# Patient Record
Sex: FEMALE | Race: WHITE | NOT HISPANIC OR LATINO | URBAN - METROPOLITAN AREA
[De-identification: names, ages, dates, MRNs, and addresses within clinical notes are randomized per-mention and may not be internally consistent; named-entity substitution may affect disease eponyms.]

---

## 2022-01-13 ENCOUNTER — LAB (OUTPATIENT)
Dept: LAB | Facility: HOSPITAL | Age: 47
End: 2022-01-13

## 2022-01-13 ENCOUNTER — OFFICE VISIT (OUTPATIENT)
Dept: NEUROLOGY | Facility: CLINIC | Age: 47
End: 2022-01-13

## 2022-01-13 VITALS
TEMPERATURE: 98.4 F | HEART RATE: 62 BPM | SYSTOLIC BLOOD PRESSURE: 140 MMHG | DIASTOLIC BLOOD PRESSURE: 78 MMHG | OXYGEN SATURATION: 99 %

## 2022-01-13 DIAGNOSIS — M79.10 MYALGIA: ICD-10-CM

## 2022-01-13 DIAGNOSIS — R20.2 PARESTHESIA: ICD-10-CM

## 2022-01-13 DIAGNOSIS — R25.1 TREMOR OF BOTH HANDS: ICD-10-CM

## 2022-01-13 DIAGNOSIS — R29.898 WEAKNESS OF BOTH HANDS: ICD-10-CM

## 2022-01-13 DIAGNOSIS — R20.2 PARESTHESIA: Primary | ICD-10-CM

## 2022-01-13 PROBLEM — N92.0 MENORRHAGIA WITH REGULAR CYCLE: Status: ACTIVE | Noted: 2019-02-04

## 2022-01-13 PROBLEM — I10 HTN (HYPERTENSION): Status: ACTIVE | Noted: 2019-04-11

## 2022-01-13 PROBLEM — E65: Status: ACTIVE | Noted: 2019-04-11

## 2022-01-13 PROCEDURE — 86051 AQUAPORIN-4 ANTB ELISA: CPT

## 2022-01-13 PROCEDURE — 86200 CCP ANTIBODY: CPT

## 2022-01-13 PROCEDURE — 82550 ASSAY OF CK (CPK): CPT

## 2022-01-13 PROCEDURE — 86431 RHEUMATOID FACTOR QUANT: CPT

## 2022-01-13 PROCEDURE — 84155 ASSAY OF PROTEIN SERUM: CPT

## 2022-01-13 PROCEDURE — 86140 C-REACTIVE PROTEIN: CPT

## 2022-01-13 PROCEDURE — 84165 PROTEIN E-PHORESIS SERUM: CPT

## 2022-01-13 PROCEDURE — 86362 MOG-IGG1 ANTB CBA EACH: CPT

## 2022-01-13 PROCEDURE — 86255 FLUORESCENT ANTIBODY SCREEN: CPT

## 2022-01-13 PROCEDURE — 83519 RIA NONANTIBODY: CPT

## 2022-01-13 PROCEDURE — 36415 COLL VENOUS BLD VENIPUNCTURE: CPT

## 2022-01-13 PROCEDURE — 86618 LYME DISEASE ANTIBODY: CPT

## 2022-01-13 PROCEDURE — 82595 ASSAY OF CRYOGLOBULIN: CPT

## 2022-01-13 PROCEDURE — 86334 IMMUNOFIX E-PHORESIS SERUM: CPT

## 2022-01-13 PROCEDURE — 99204 OFFICE O/P NEW MOD 45 MIN: CPT | Performed by: NURSE PRACTITIONER

## 2022-01-13 PROCEDURE — 86038 ANTINUCLEAR ANTIBODIES: CPT

## 2022-01-13 PROCEDURE — 82164 ANGIOTENSIN I ENZYME TEST: CPT

## 2022-01-13 PROCEDURE — 82728 ASSAY OF FERRITIN: CPT

## 2022-01-13 PROCEDURE — 82784 ASSAY IGA/IGD/IGG/IGM EACH: CPT

## 2022-01-13 RX ORDER — PREGABALIN 75 MG/1
75 CAPSULE ORAL 2 TIMES DAILY
Qty: 60 CAPSULE | Refills: 2 | Status: SHIPPED | OUTPATIENT
Start: 2022-01-13 | End: 2022-01-13 | Stop reason: SDUPTHER

## 2022-01-13 RX ORDER — DULOXETIN HYDROCHLORIDE 60 MG/1
CAPSULE, DELAYED RELEASE ORAL
COMMUNITY
Start: 2021-12-14

## 2022-01-13 RX ORDER — PREGABALIN 75 MG/1
75 CAPSULE ORAL 2 TIMES DAILY
Qty: 60 CAPSULE | Refills: 2 | Status: SHIPPED | OUTPATIENT
Start: 2022-01-13 | End: 2022-01-14 | Stop reason: SDUPTHER

## 2022-01-13 RX ORDER — METOPROLOL SUCCINATE 25 MG/1
TABLET, EXTENDED RELEASE ORAL
COMMUNITY
Start: 2021-12-14 | End: 2022-02-24

## 2022-01-13 NOTE — PROGRESS NOTES
Neuro Office Visit      Encounter Date: 2022   Patient Name: Obdulia Ramesh  : 1975   MRN: [id number]   PCP: Dayanna Arango MD  Referring: VINI Boo  Chief Complaint:    Chief Complaint   Patient presents with   • Tremors   • Hand Pain   • Headache       History of Present Illness: Obdulia Ramesh is a 46 y.o. female who is here today in Neurology for tremor, paresthesia, HA    Has had multiple evaluations for symptoms.    Previously evaluated by Dr Webster      Tremor  Onset 3 years ago. Started in hands. Last year developed head tremor. R>L. Worse with activity. Not noticeable at rest. Worse with activity and stress and fatigue. Yes, yes, tremor. Worse with fatigue and stress.    Hand pain  Thenar emminence constant, worse with movement, can keep her awake at night. Sharp, cutting sensation. Tylenol and motrin not effective. No relief. Also had whole hand pain felt like pressure from the inside. Did not look swollen but felt swollen. Hands are weak.  No change in shoulders.   Intermittent neck pain is tension right posterior neck to scapula. Not worse with movement. Decreased sensation in hands. Descreased discrimination of hot/cold    Paresthesia  Bilat UE and bilat LE numbness and tingling/pins and needles x 10 months. Feet feel like a cold chill.   Symptoms are intermittent. Feel jittery.  Feels like she needs to move. No aggravating or alleviating factors. Keeps her awake at night. Heat does not help    EMG upper ext-nml      HA  Has had HA for years. Worse in last 1-2 years  Freq: daily mild HA. 1-2 severe HA/week  Location: top of head  Quality: Pressure from the inside, Center of head to holocranial.  Severity: /10. Has not been to ER  Duration: Daily HA is all day. Severe HA is 1-2 hours  Assoc sx: +nausea, -vomiting, +photophobia, + phonophobia, no vision changes, occasional low pitched ringing, not whooshing sound    Abortives:Excedrin, Tylenol, IBU  Preventatives:Cymbalta,  metoprolol      Meds: Duloxetine  Labs TSH, vit D, vit B12, cmp,  Cbc, I7d-eff  Subjective      Past Medical History: History reviewed. No pertinent past medical history.    Past Surgical History:   Past Surgical History:   Procedure Laterality Date   • BREAST AUGMENTATION     • EXPLORATORY LAPAROTOMY         Family History:   Family History   Problem Relation Age of Onset   • Liver cancer Mother    • Heart disease Father    • Skin cancer Father    • No Known Problems Sister    • No Known Problems Brother    • Breast cancer Maternal Grandmother    • Colon cancer Maternal Grandmother    • Leukemia Maternal Grandmother    • No Known Problems Maternal Grandfather    • No Known Problems Paternal Grandmother    • No Known Problems Paternal Grandfather    • No Known Problems Daughter    • Hashimoto's thyroiditis Daughter        Social History:   Social History     Socioeconomic History   • Marital status:    Tobacco Use   • Smoking status: Never Smoker   • Smokeless tobacco: Never Used   Substance and Sexual Activity   • Alcohol use: Yes     Alcohol/week: 2.0 standard drinks     Types: 2 Glasses of wine per week     Comment: per year   • Drug use: Never   • Sexual activity: Yes       Medications:     Current Outpatient Medications:   •  DULoxetine (CYMBALTA) 60 MG capsule, TAKE 1 CAPSULE BY MOUTH ONCE DAILY DO NOT CRUSH OR CHEW, Disp: , Rfl:   •  metoprolol succinate XL (TOPROL-XL) 25 MG 24 hr tablet, TAKE 1 TABLET BY MOUTH ONCE DAILY. DO NOT CRUSH OR CHEW, Disp: , Rfl:   •  pregabalin (Lyrica) 75 MG capsule, Take 1 capsule by mouth 2 (Two) Times a Day., Disp: 60 capsule, Rfl: 2    Allergies:   Allergies   Allergen Reactions   • Clindamycin Itching, Rash and Unknown (See Comments)   • Latex Itching     sensitivity       PHQ-9 Total Score:     STEADI Fall Risk Assessment has not been completed.    Objective     Physical Exam:   Physical Exam  Eyes:      Pupils: Pupils are equal, round, and reactive to light.    Neurological:      Mental Status: She is oriented to person, place, and time.      Coordination: Finger-Nose-Finger Test, Heel to Shin Test and Romberg Test normal.      Gait: Gait is intact.      Deep Tendon Reflexes:      Reflex Scores:       Tricep reflexes are 2+ on the right side and 2+ on the left side.       Bicep reflexes are 2+ on the right side and 2+ on the left side.       Brachioradialis reflexes are 2+ on the right side and 2+ on the left side.       Patellar reflexes are 2+ on the right side and 2+ on the left side.       Achilles reflexes are 2+ on the right side and 2+ on the left side.  Psychiatric:         Speech: Speech normal.         Neurologic Exam     Mental Status   Oriented to person, place, and time.   Follows 3 step commands.   Attention: normal. Concentration: normal.   Speech: speech is normal   Level of consciousness: alert  Knowledge: consistent with education.   Normal comprehension.     Cranial Nerves     CN III, IV, VI   Pupils are equal, round, and reactive to light.  Right pupil: Accommodation: intact.   Left pupil: Accommodation: intact.   CN III: no CN III palsy  CN VI: no CN VI palsy  Nystagmus: none   Diplopia: none  Upgaze: normal  Downgaze: normal  Conjugate gaze: present    CN VII   Facial expression full, symmetric.     CN VIII   Hearing: intact    CN XII   CN XII normal.     Motor Exam   Muscle bulk: normal  Overall muscle tone: normal    Strength   Right deltoid: 4/5  Left deltoid: 5/5  Right biceps: 4/5  Left biceps: 5/5  Right triceps: 4/5  Left triceps: 5/5  Right wrist flexion: 4/5  Left wrist flexion: 5/5  Right wrist extension: 4/5  Left wrist extension: 5/5  Right interossei: 4/5  Left interossei: 5/5  Right abdominals: 5/5  Left abdominals: 5/5  Right iliopsoas: 5/5  Left iliopsoas: 5/5  Right quadriceps: 5/5  Left quadriceps: 5/5  Right hamstrin/5  Left hamstrin/5  Right glutei: 5/5  Left glutei: 5/5  Right anterior tibial: 5/5  Left anterior tibial:  5/5  Right posterior tibial: 5/5  Left posterior tibial: 5/5  Right peroneal: 5/5  Left peroneal: 5/5  Right gastroc: 5/5  Left gastroc: 5/5    Sensory Exam   Right arm light touch: decreased from elbow  Left arm light touch: normal  Right leg light touch: decreased from knee  Left leg light touch: normal    Gait, Coordination, and Reflexes     Gait  Gait: normal    Coordination   Romberg: negative  Finger to nose coordination: normal  Heel to shin coordination: normal    Tremor   Resting tremor: absent (Head tremor not appreciated)  Intention tremor: present  Action tremor: right arm    Reflexes   Right brachioradialis: 2+  Left brachioradialis: 2+  Right biceps: 2+  Left biceps: 2+  Right triceps: 2+  Left triceps: 2+  Right patellar: 2+  Left patellar: 2+  Right achilles: 2+  Left achilles: 2+  Right : 2+  Left : 2+       Vital Signs:   Vitals:    01/13/22 1508   BP: 140/78   BP Location: Left arm   Pulse: 62   Temp: 98.4 °F (36.9 °C)   SpO2: 99%     There is no height or weight on file to calculate BMI.       Assessment / Plan      Assessment/Plan:   Diagnoses and all orders for this visit:    1. Paresthesia (Primary)  -     Lyme Disease Antibodies, Total and IgM with Reflex to Line Blot; Future  -     CK; Future  -     C-reactive Protein; Future  -     Cryoglobulin; Future  -     Ferritin; Future  -     ERIC + PE; Future  -     Myasthenia Gravis Full Panel w/MuSK Reflex; Future  -     Rheumatoid Arthritis (RA) Profile; Future  -     JAMISON by IFA, Reflex 9-biomarkers profile; Future  -     Angiotensin Converting Enzyme; Future  -     Neuromyelitis Optica (NMO) Auto Antibody, IgG; Future  -     Anti-Myelin Oligodendrocyte Glycoprotein (MOG), Serum; Future  -     MRI Brain With & Without Contrast; Future  -     MRI Cervical Spine With & Without Contrast; Future  -     EMG & Nerve Conduction Test; Future  -     pregabalin (Lyrica) 75 MG capsule; Take 1 capsule by mouth 2 (Two) Times a Day.  Dispense: 60  capsule; Refill: 2    2. Weakness of both hands  -     Lyme Disease Antibodies, Total and IgM with Reflex to Line Blot; Future  -     CK; Future  -     C-reactive Protein; Future  -     Cryoglobulin; Future  -     Ferritin; Future  -     ERIC + PE; Future  -     Myasthenia Gravis Full Panel w/MuSK Reflex; Future  -     Rheumatoid Arthritis (RA) Profile; Future  -     JAMISON by IFA, Reflex 9-biomarkers profile; Future  -     Angiotensin Converting Enzyme; Future  -     Neuromyelitis Optica (NMO) Auto Antibody, IgG; Future  -     Anti-Myelin Oligodendrocyte Glycoprotein (MOG), Serum; Future  -     MRI Brain With & Without Contrast; Future  -     MRI Cervical Spine With & Without Contrast; Future  -     EMG & Nerve Conduction Test; Future  -     pregabalin (Lyrica) 75 MG capsule; Take 1 capsule by mouth 2 (Two) Times a Day.  Dispense: 60 capsule; Refill: 2    3. Myalgia  -     CK; Future  -     pregabalin (Lyrica) 75 MG capsule; Take 1 capsule by mouth 2 (Two) Times a Day.  Dispense: 60 capsule; Refill: 2    4. Tremor of both hands  -     C-reactive Protein; Future  -     ERIC + PE; Future  -     Myasthenia Gravis Full Panel w/MuSK Reflex; Future  -     MRI Brain With & Without Contrast; Future  -     MRI Cervical Spine With & Without Contrast; Future  -     pregabalin (Lyrica) 75 MG capsule; Take 1 capsule by mouth 2 (Two) Times a Day.  Dispense: 60 capsule; Refill: 2      As a part of this patient's therapy a controlled substance was prescribed. Instructed on the safe and proper use of this medication along with potential risks. Controlled substance contract signed and scanned. Bernard will be reviewed and UDS obtained as indicated.      Patient Education:     Reviewed medications, potential side effects and signs and symptoms to report. Discussed risk versus benefits of treatment plan with patient and/or family-including medications, labs and radiology that may be ordered. Addressed questions and concerns during visit.  Patient and/or family verbalized understanding and agree with plan. Instructed to call the office with any questions and report to ER with any life-threatening symptoms.     Follow Up:   Return in about 6 weeks (around 2/24/2022) for Recheck.    During this visit the following were done:  Labs Reviewed [x]    Labs Ordered [x]    Radiology Reports Reviewed []    Radiology Ordered [x]    PCP Records Reviewed [x]    Referring Provider Records Reviewed [x]    ER Records Reviewed []    Hospital Records Reviewed []    History Obtained From Family []    Radiology Images Reviewed []    Other Reviewed [x]    Records Requested []      Titi Méndez, DNP, APRN

## 2022-01-14 ENCOUNTER — TELEPHONE (OUTPATIENT)
Dept: NEUROLOGY | Facility: CLINIC | Age: 47
End: 2022-01-14

## 2022-01-14 DIAGNOSIS — R25.1 TREMOR OF BOTH HANDS: ICD-10-CM

## 2022-01-14 DIAGNOSIS — R20.2 PARESTHESIA: ICD-10-CM

## 2022-01-14 DIAGNOSIS — R74.8 ELEVATED CK: Primary | ICD-10-CM

## 2022-01-14 DIAGNOSIS — M79.10 MYALGIA: ICD-10-CM

## 2022-01-14 DIAGNOSIS — R29.898 WEAKNESS OF BOTH HANDS: ICD-10-CM

## 2022-01-14 LAB
CK SERPL-CCNC: 475 U/L (ref 20–180)
CRP SERPL-MCNC: <0.3 MG/DL (ref 0–0.5)
FERRITIN SERPL-MCNC: 36.3 NG/ML (ref 13–150)

## 2022-01-14 RX ORDER — PREGABALIN 75 MG/1
75 CAPSULE ORAL 2 TIMES DAILY
Qty: 60 CAPSULE | Refills: 2 | Status: SHIPPED | OUTPATIENT
Start: 2022-01-14 | End: 2022-04-15 | Stop reason: SDUPTHER

## 2022-01-14 NOTE — TELEPHONE ENCOUNTER
Called and discussed CK. Denies trauma, exercise, supplements. Will rest and hydrate this weekend. Repeat labs next week.

## 2022-01-14 NOTE — TELEPHONE ENCOUNTER
Caller: Obdulia Ramesh    Relationship: Self    Best call back number: [phone]    Requested Prescriptions:   Requested Prescriptions     Pending Prescriptions Disp Refills   • pregabalin (Lyrica) 75 MG capsule 60 capsule 2     Sig: Take 1 capsule by mouth 2 (Two) Times a Day.        Pharmacy where request should be sent: [id number] - [address] - [phone] [facility] [phone] FX     Additional details provided by patient: PT STATES D/T INSURANCE REASONS SHE IS HAVING TO CHANGE HER PHARMACY, SHE ASK THAT THIS PRESCRIPTION BE SENT TO [facility] I HAVE INDEXED IN CHART. PLEASE REVIEW AND ADVISE. THANK YOU.     Does the patient have less than a 3 day supply:  [] Yes  [] No    Ruby Herrera Rep   01/14/22 08:58 EST

## 2022-01-15 LAB
B BURGDOR IGG+IGM SER-ACNC: <0.91 ISR (ref 0–0.9)
B BURGDOR IGM SER IA-ACNC: <0.8 INDEX (ref 0–0.79)

## 2022-01-16 LAB
ANA TITR SER IF: NEGATIVE {TITER}
LABORATORY COMMENT REPORT: NORMAL

## 2022-01-17 LAB
ACE SERPL-CCNC: 25 U/L (ref 14–82)
ALBUMIN SERPL ELPH-MCNC: 3.9 G/DL (ref 2.9–4.4)
ALBUMIN/GLOB SERPL: 1.2 {RATIO} (ref 0.7–1.7)
ALPHA1 GLOB SERPL ELPH-MCNC: 0.2 G/DL (ref 0–0.4)
ALPHA2 GLOB SERPL ELPH-MCNC: 0.7 G/DL (ref 0.4–1)
B-GLOBULIN SERPL ELPH-MCNC: 1.2 G/DL (ref 0.7–1.3)
GAMMA GLOB SERPL ELPH-MCNC: 1.2 G/DL (ref 0.4–1.8)
GLOBULIN SER-MCNC: 3.3 G/DL (ref 2.2–3.9)
IGA SERPL-MCNC: 367 MG/DL (ref 87–352)
IGG SERPL-MCNC: 1179 MG/DL (ref 586–1602)
IGM SERPL-MCNC: 139 MG/DL (ref 26–217)
INTERPRETATION SERPL IEP-IMP: ABNORMAL
LABORATORY COMMENT REPORT: ABNORMAL
M PROTEIN SERPL ELPH-MCNC: ABNORMAL G/DL
MOG AB SER QL CBA IFA: NEGATIVE
PROT SERPL-MCNC: 7.2 G/DL (ref 6–8.5)

## 2022-01-18 LAB
CCP IGA+IGG SERPL IA-ACNC: 5 UNITS (ref 0–19)
RHEUMATOID FACT SERPL-ACNC: <10 IU/ML

## 2022-01-20 LAB — CRYOGLOB SER QL 1D COLD INC: NORMAL

## 2022-01-21 ENCOUNTER — LAB (OUTPATIENT)
Dept: LAB | Facility: HOSPITAL | Age: 47
End: 2022-01-21

## 2022-01-21 DIAGNOSIS — R74.8 ELEVATED CK: ICD-10-CM

## 2022-01-21 LAB
ANION GAP SERPL CALCULATED.3IONS-SCNC: 9.9 MMOL/L (ref 5–15)
AQP4 H2O CHANNEL IGG SERPL QL: NEGATIVE
BUN SERPL-MCNC: 8 MG/DL (ref 6–20)
BUN/CREAT SERPL: 9.8 (ref 7–25)
CALCIUM SPEC-SCNC: 9.2 MG/DL (ref 8.6–10.5)
CHLORIDE SERPL-SCNC: 104 MMOL/L (ref 98–107)
CK SERPL-CCNC: 77 U/L (ref 20–180)
CO2 SERPL-SCNC: 28.1 MMOL/L (ref 22–29)
CREAT SERPL-MCNC: 0.82 MG/DL (ref 0.57–1)
GFR SERPL CREATININE-BSD FRML MDRD: 75 ML/MIN/1.73
GLUCOSE SERPL-MCNC: 59 MG/DL (ref 65–99)
POTASSIUM SERPL-SCNC: 4 MMOL/L (ref 3.5–5.2)
SODIUM SERPL-SCNC: 142 MMOL/L (ref 136–145)

## 2022-01-21 PROCEDURE — 80048 BASIC METABOLIC PNL TOTAL CA: CPT

## 2022-01-21 PROCEDURE — 36415 COLL VENOUS BLD VENIPUNCTURE: CPT

## 2022-01-21 PROCEDURE — 82550 ASSAY OF CK (CPK): CPT

## 2022-01-24 ENCOUNTER — TELEPHONE (OUTPATIENT)
Dept: NEUROLOGY | Facility: CLINIC | Age: 47
End: 2022-01-24

## 2022-01-24 NOTE — TELEPHONE ENCOUNTER
----- Message from Titi Méndez, MEHRAN, APRN sent at 1/24/2022  8:13 AM EST -----  Pls notify pt her labs are stable. Electrolytes and muscle breakdown are normalo.

## 2022-01-25 LAB
ACHR BIND AB SER-SCNC: 0.05 NMOL/L (ref 0–0.24)
ACHR BLOCK AB SER-ACNC: 18 % (ref 0–25)
ACHR MOD AB/ACHR TOTAL SFR SER: NORMAL %
MUSK AB SER-SCNC: <1 U/ML
REFLEX INFORMATION: NORMAL
STRIA MUS AB TITR SER IF: NORMAL {TITER}

## 2022-02-05 ENCOUNTER — HOSPITAL ENCOUNTER (OUTPATIENT)
Dept: MRI IMAGING | Facility: HOSPITAL | Age: 47
Discharge: HOME | End: 2022-02-05

## 2022-02-05 DIAGNOSIS — R25.1 TREMOR OF BOTH HANDS: ICD-10-CM

## 2022-02-05 DIAGNOSIS — R20.2 PARESTHESIA: ICD-10-CM

## 2022-02-05 DIAGNOSIS — R29.898 WEAKNESS OF BOTH HANDS: ICD-10-CM

## 2022-02-05 PROCEDURE — 0 GADOBENATE DIMEGLUMINE 529 MG/ML SOLUTION: Performed by: NURSE PRACTITIONER

## 2022-02-05 PROCEDURE — 70553 MRI BRAIN STEM W/O & W/DYE: CPT

## 2022-02-05 PROCEDURE — 72156 MRI NECK SPINE W/O & W/DYE: CPT

## 2022-02-05 PROCEDURE — A9577 INJ MULTIHANCE: HCPCS | Performed by: NURSE PRACTITIONER

## 2022-02-05 RX ADMIN — GADOBENATE DIMEGLUMINE 15 ML: 529 INJECTION, SOLUTION INTRAVENOUS at 10:42

## 2022-02-09 ENCOUNTER — TELEPHONE (OUTPATIENT)
Dept: NEUROLOGY | Facility: CLINIC | Age: 47
End: 2022-02-09

## 2022-02-09 NOTE — TELEPHONE ENCOUNTER
Caller: Obdulia Ramesh    Relationship: Self    Best call back number: [phone]    What is the best time to reach you: ANY    Who are you requesting to speak with (clinical staff, provider,  specific staff member): CLINICAL    What was the call regarding: PT IS CALLING TO GET HER MRI RESULTS    Do you require a callback: YES

## 2022-02-10 ENCOUNTER — TELEPHONE (OUTPATIENT)
Dept: NEUROLOGY | Facility: CLINIC | Age: 47
End: 2022-02-10

## 2022-02-10 DIAGNOSIS — R90.89 ABNORMAL FINDING ON MRI OF BRAIN: Primary | ICD-10-CM

## 2022-02-14 ENCOUNTER — HOSPITAL ENCOUNTER (OUTPATIENT)
Dept: GENERAL RADIOLOGY | Facility: HOSPITAL | Age: 47
Discharge: HOME | End: 2022-02-14
Admitting: RADIOLOGY

## 2022-02-14 VITALS
HEART RATE: 71 BPM | BODY MASS INDEX: 28.32 KG/M2 | WEIGHT: 176.2 LBS | SYSTOLIC BLOOD PRESSURE: 112 MMHG | HEIGHT: 66 IN | DIASTOLIC BLOOD PRESSURE: 90 MMHG

## 2022-02-14 DIAGNOSIS — R90.89 ABNORMAL FINDING ON MRI OF BRAIN: ICD-10-CM

## 2022-02-14 LAB
APPEARANCE CSF: CLEAR
APPEARANCE CSF: CLEAR
B-HCG UR QL: NEGATIVE
COLOR CSF: COLORLESS
COLOR CSF: COLORLESS
COLOR SPUN CSF: COLORLESS
COLOR SPUN CSF: COLORLESS
CRYPTOC AG CSF QL LA: NEGATIVE
GLUCOSE CSF-MCNC: 58 MG/DL (ref 40–70)
PROT CSF-MCNC: 15.9 MG/DL (ref 15–45)
RBC # CSF MANUAL: 1 /MM3 (ref 0–5)
RBC # CSF MANUAL: 3 /MM3 (ref 0–5)
SPECIMEN VOL CSF: 10 ML
SPECIMEN VOL CSF: 10 ML
TUBE # CSF: 1
TUBE # CSF: 4
WBC # CSF MANUAL: 0 /MM3 (ref 0–5)
WBC # CSF MANUAL: 0 /MM3 (ref 0–5)

## 2022-02-14 PROCEDURE — 87015 SPECIMEN INFECT AGNT CONCNTJ: CPT | Performed by: NURSE PRACTITIONER

## 2022-02-14 PROCEDURE — 84157 ASSAY OF PROTEIN OTHER: CPT | Performed by: NURSE PRACTITIONER

## 2022-02-14 PROCEDURE — 88112 CYTOPATH CELL ENHANCE TECH: CPT | Performed by: NURSE PRACTITIONER

## 2022-02-14 PROCEDURE — 82040 ASSAY OF SERUM ALBUMIN: CPT | Performed by: NURSE PRACTITIONER

## 2022-02-14 PROCEDURE — 82784 ASSAY IGA/IGD/IGG/IGM EACH: CPT | Performed by: NURSE PRACTITIONER

## 2022-02-14 PROCEDURE — 87327 CRYPTOCOCCUS NEOFORM AG IA: CPT | Performed by: NURSE PRACTITIONER

## 2022-02-14 PROCEDURE — 82945 GLUCOSE OTHER FLUID: CPT | Performed by: NURSE PRACTITIONER

## 2022-02-14 PROCEDURE — 82042 OTHER SOURCE ALBUMIN QUAN EA: CPT | Performed by: NURSE PRACTITIONER

## 2022-02-14 PROCEDURE — 0 LIDOCAINE 1 % SOLUTION: Performed by: PHYSICIAN ASSISTANT

## 2022-02-14 PROCEDURE — 87075 CULTR BACTERIA EXCEPT BLOOD: CPT | Performed by: NURSE PRACTITIONER

## 2022-02-14 PROCEDURE — 83916 OLIGOCLONAL BANDS: CPT | Performed by: NURSE PRACTITIONER

## 2022-02-14 PROCEDURE — 87070 CULTURE OTHR SPECIMN AEROBIC: CPT | Performed by: NURSE PRACTITIONER

## 2022-02-14 PROCEDURE — 89050 BODY FLUID CELL COUNT: CPT | Performed by: NURSE PRACTITIONER

## 2022-02-14 PROCEDURE — 87102 FUNGUS ISOLATION CULTURE: CPT | Performed by: NURSE PRACTITIONER

## 2022-02-14 PROCEDURE — 87206 SMEAR FLUORESCENT/ACID STAI: CPT | Performed by: NURSE PRACTITIONER

## 2022-02-14 PROCEDURE — 81025 URINE PREGNANCY TEST: CPT | Performed by: RADIOLOGY

## 2022-02-14 PROCEDURE — 87116 MYCOBACTERIA CULTURE: CPT | Performed by: NURSE PRACTITIONER

## 2022-02-14 PROCEDURE — 82164 ANGIOTENSIN I ENZYME TEST: CPT | Performed by: NURSE PRACTITIONER

## 2022-02-14 PROCEDURE — 87205 SMEAR GRAM STAIN: CPT | Performed by: NURSE PRACTITIONER

## 2022-02-14 RX ORDER — LIDOCAINE HYDROCHLORIDE 10 MG/ML
5 INJECTION, SOLUTION INFILTRATION; PERINEURAL ONCE
Status: COMPLETED | OUTPATIENT
Start: 2022-02-14 | End: 2022-02-14

## 2022-02-14 RX ADMIN — LIDOCAINE HYDROCHLORIDE 5 ML: 10 INJECTION, SOLUTION INFILTRATION; PERINEURAL at 11:01

## 2022-02-14 NOTE — POST-PROCEDURE NOTE
Radiology Procedure    Pre-procedure: procedure, risks discussed with patient. Patient indicated understanding and consented to procedure.     Procedure Performed: lumbar puncture     IV Sedation and/or Anesthesia:  No    Complications: none    Preliminary Findings: pending    Specimen Removed: 8cc clear, colorless CSF    Estimated Blood Loss:  0ml    Post-Procedure Diagnosis: pending    Post-Procedure Plan: encourage fluids, bed rest x 2 hours    Standard Discharge Instructions Given:yes     IRVIN Parada  02/14/22  10:36 EST

## 2022-02-15 ENCOUNTER — TELEPHONE (OUTPATIENT)
Dept: INFUSION THERAPY | Facility: HOSPITAL | Age: 47
End: 2022-02-15

## 2022-02-15 LAB
LAB AP CASE REPORT: NORMAL
PATH REPORT.FINAL DX SPEC: NORMAL

## 2022-02-16 LAB
ACE CSF-CCNC: <1.5 U/L (ref 0–2.5)
ALB CSF/SERPL: 2 {RATIO} (ref 0–8)
ALBUMIN CSF-MCNC: 8 MG/DL (ref 8–37)
ALBUMIN SERPL-MCNC: 4.1 G/DL (ref 3.8–4.8)
IGG CSF-MCNC: 1.1 MG/DL (ref 0–6.7)
IGG SERPL-MCNC: 1077 MG/DL (ref 586–1602)
IGG SYNTH RATE SER+CSF CALC-MRATE: -3.5 MG/DAY
IGG/ALB CLEAR SER+CSF-RTO: 0.5 (ref 0–0.7)
IGG/ALB CSF: 0.14 {RATIO} (ref 0–0.25)
OLIGOCLONAL BANDS.IT SER+CSF QL: NORMAL

## 2022-02-17 LAB
BACTERIA SPEC AEROBE CULT: NORMAL
GRAM STN SPEC: NORMAL

## 2022-02-18 ENCOUNTER — TELEPHONE (OUTPATIENT)
Dept: NEUROLOGY | Facility: CLINIC | Age: 47
End: 2022-02-18

## 2022-02-19 LAB — BACTERIA SPEC ANAEROBE CULT: NORMAL

## 2022-02-21 ENCOUNTER — PROCEDURE VISIT (OUTPATIENT)
Dept: NEUROLOGY | Facility: CLINIC | Age: 47
End: 2022-02-21

## 2022-02-21 DIAGNOSIS — R20.0 NUMBNESS AND TINGLING OF BOTH LOWER EXTREMITIES: Primary | ICD-10-CM

## 2022-02-21 DIAGNOSIS — R20.2 NUMBNESS AND TINGLING OF BOTH LOWER EXTREMITIES: Primary | ICD-10-CM

## 2022-02-21 PROCEDURE — 95911 NRV CNDJ TEST 9-10 STUDIES: CPT | Performed by: PSYCHIATRY & NEUROLOGY

## 2022-02-21 PROCEDURE — 95886 MUSC TEST DONE W/N TEST COMP: CPT | Performed by: PSYCHIATRY & NEUROLOGY

## 2022-02-21 NOTE — PROGRESS NOTES
[facility]   Electrodiagnostic Laboratory    Nerve Conduction & EMG Report        Patient:  Obdulia Ramesh   Patient ID: [id number]   YOB: 1975  Sex:  female      Exam Physician:  Kem Scruggs MD  Refer Physician:  Titi MARTINI    Electromyogram and Nerve Conduction Velocity Procedure Note    Hx: 46 y.o. right handed female with complaint of numbness involving the the upper and lower extremities. Symptoms have been present for several years and were provoked by no clear event. Significant past medical history includes nothing suggestive of neuropathy.  Family history no family history of nerve or muscle disease.    Exam: Motor power is normal. There is no atrophy. There are no fasciculations. Deep tendon reflexes are present and symmetrical. Sensory exam is normal.      Edx studies of the B LE were performed to evaluate for peripheral neuropathy    NCS Examination   For sensory nerve conduction studies, the amplitude is measured peak-to-peak, the latency reported is the distal peak latency, and the conduction velocity, if measured, is determined from onset latencies and is over the forearm.   For motor nerve conduction studies, the amplitude is measured baseline-to-peak, the latency reported is the distal onset latency, the conduction velocity is calculated over the forearm, and the F wave latency is the minimum latency.   Unless otherwise noted, the hand temperature was monitored continuously and remained between 32°C and 36°C during the performance of the NCSs.            Sensory NCS      Nerve / Sites Rec. Site Onset Lat Peak Lat NP Amp PP Amp Segments Distance Velocity     ms ms µV µV  mm m/s   L Sural - Ankle (Calf)      Calf Ankle 2.03 2.71 4.2 17.6 Calf - Ankle 140 69   R Sural - Ankle (Calf)      Calf Ankle 2.03 2.60 4.4 4.7 Calf - Ankle 140 69   L Superficial peroneal - Ankle      Lat leg Ankle 4.43 6.35 16.6 4.5 Lat leg - Ankle 140 32   R Superficial  peroneal - Ankle      Lat leg Ankle 2.92 3.49 5.1 35.8 Lat leg - Ankle 140 48               Motor NCS      Nerve / Sites Muscle Latency Amplitude Amp % Duration Segments Distance Lat Diff Velocity     ms mV % ms  mm ms m/s   L Peroneal - EDB      Ankle EDB 2.71 6.8 100 6.61 Ankle - EDB 80        Fib head EDB 11.41 4.7 68.3 7.14 Fib head - Ankle 380 8.70 44      Pop fossa EDB 12.81 6.7 98.7 6.56 Pop fossa - Fib head 90 1.41 64   R Peroneal - EDB      Ankle EDB 5.89 2.5 100 5.83 Ankle - EDB 80        Fib head EDB 13.02 2.3 95.2 6.98 Fib head - Ankle 350 7.14 49      Pop fossa EDB 14.58 2.0 79.8 7.40 Pop fossa - Fib head 90 1.56 58   L Tibial - AH      Ankle AH 4.69 10.0 100 6.93 Ankle - AH 80        Pop fossa AH 14.17 4.2 41.9 8.75 Pop fossa - Ankle 430 9.48 45   R Tibial - AH      Ankle AH 4.79 9.7 100 5.73 Ankle - AH 80        Pop fossa AH 13.91 9.3 96.4 5.99 Pop fossa - Ankle 430 9.11 47               F  Wave      Nerve F Lat M Lat F-M Lat    ms ms ms   L Peroneal - EDB 49.6 2.7 46.9   L Tibial - AH 53.4 4.5 48.9   R Tibial - AH 50.1 4.8 45.3   R Peroneal - EDB 49.0 7.6 41.4               H Reflex      Nerve H Lat    ms   L Tibial - Soleus 30.1   R Tibial - Soleus 30.3           EMG         EMG Summary Table     Spontaneous MUAP Recruitment   Muscle Nerve Roots IA Fib PSW Fasc H.F. Amp Dur. PPP Pattern   L. Tibialis anterior Deep peroneal (Fibular) L4-L5 N None None None None N N N N   R. Tibialis anterior Deep peroneal (Fibular) L4-L5 N None None None None N N N N   L. Tibialis posterior Tibial L4-L5 N None None None None N N N N   R. Tibialis posterior Tibial L4-L5 N None None None None N N N N   L. Vastus lateralis Femoral L2-L4 N None None None None N N N N   R. Vastus lateralis Femoral L2-L4 N None None None None N N N N   L. Biceps femoris (long head) Sciatic (tibial division) L5-S2 N None None None None N N N N   R. Biceps femoris (long head) Sciatic (tibial division) L5-S2 N None None None None N N N N   L.  Gastrocnemius (Medial head) Tibial S1-S2 N None None None None N N N N   R. Gastrocnemius (Medial head) Tibial S1-S2 N None None None None N N N N   L. Iliopsoas Femoral L2-L3 N None None None None N N N N   R. Iliopsoas Femoral L2-L3 N None None None None N N N N       Summary    The motor conduction test was normal in all 4 of the tested nerves: L Peroneal - EDB, R Peroneal - EDB, L Tibial - AH, R Tibial - AH.    The sensory conduction test was performed on 4 nerve(s). The results were normal in 1 nerve(s): R Superficial peroneal - Ankle. Results outside the specified normal range were found in 3 nerve(s), as follows:  • In the L Sural - Ankle (Calf) study  o the peak amplitude result was reduced for Calf stimulation  • In the R Sural - Ankle (Calf) study  o the peak amplitude result was reduced for Calf stimulation  • In the L Superficial peroneal - Ankle study  o the peak latency result was increased for Lat leg stimulation    The F wave study was normal in all 4 of the tested nerves: L Peroneal - EDB, L Tibial - AH, R Tibial - AH, R Peroneal - EDB.    The H reflex study was normal in all 2 of the tested nerves: L Tibial - Soleus, R Tibial - Soleus.    The needle EMG study was normal in all 12 tested muscles: L. Tibialis anterior, R. Tibialis anterior, L. Tibialis posterior, R. Tibialis posterior, L. Vastus lateralis, R. Vastus lateralis, L. Biceps femoris (long head), R. Biceps femoris (long head), L. Gastrocnemius (Medial head), R. Gastrocnemius (Medial head), L. Iliopsoas, R. Iliopsoas.        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          Instrument used:  Teca Synergy        Performed by:          Kem Scruggs MD

## 2022-02-24 ENCOUNTER — OFFICE VISIT (OUTPATIENT)
Dept: NEUROLOGY | Facility: CLINIC | Age: 47
End: 2022-02-24

## 2022-02-24 VITALS
TEMPERATURE: 98.4 F | HEART RATE: 65 BPM | WEIGHT: 176 LBS | DIASTOLIC BLOOD PRESSURE: 98 MMHG | OXYGEN SATURATION: 99 % | SYSTOLIC BLOOD PRESSURE: 130 MMHG | BODY MASS INDEX: 28.28 KG/M2 | HEIGHT: 66 IN

## 2022-02-24 DIAGNOSIS — R25.1 INTERMITTENT TREMOR: ICD-10-CM

## 2022-02-24 DIAGNOSIS — R90.82 WHITE MATTER ABNORMALITY ON MRI OF BRAIN: Primary | ICD-10-CM

## 2022-02-24 DIAGNOSIS — I10 PRIMARY HYPERTENSION: ICD-10-CM

## 2022-02-24 DIAGNOSIS — R20.0 NUMBNESS AND TINGLING OF BOTH LOWER EXTREMITIES: Chronic | ICD-10-CM

## 2022-02-24 DIAGNOSIS — R20.2 NUMBNESS AND TINGLING OF BOTH LOWER EXTREMITIES: Chronic | ICD-10-CM

## 2022-02-24 DIAGNOSIS — G43.009 MIGRAINE WITHOUT AURA AND WITHOUT STATUS MIGRAINOSUS, NOT INTRACTABLE: ICD-10-CM

## 2022-02-24 PROCEDURE — 99214 OFFICE O/P EST MOD 30 MIN: CPT | Performed by: NURSE PRACTITIONER

## 2022-02-24 RX ORDER — PROPRANOLOL HYDROCHLORIDE 20 MG/1
20 TABLET ORAL 2 TIMES DAILY
Qty: 60 TABLET | Refills: 5 | Status: SHIPPED | OUTPATIENT
Start: 2022-02-24 | End: 2023-01-23 | Stop reason: DRUGHIGH

## 2022-02-24 NOTE — PROGRESS NOTES
Neuro Office Visit      Encounter Date: 2022   Patient Name: Obdulia Ramesh  : 1975   MRN: [id number]   PCP: Dr Arango  Chief Complaint:    Chief Complaint   Patient presents with   • Follow-up     6 week follow up       History of Present Illness: Obdulia Ramesh is a 46 y.o. female who is here today in Neurology for tremor, hand pain, paresthesia, ha    Last visit 22 w me-labs, MRI, EMG, Lyrica    Interval history  Elevated CK-now normalized    MRI brainMRI Brain With & Without Contrast (2022 10:42)  Multifocal supratentorial T2 hyperintense white matter  lesions, which while nonspecific raise concern for demyelinating  process. There is no evidence of enhancement to suggest active  demyelinating plaque.  MRI c-spineMRI Cervical Spine With & Without Contrast (2022 10:43)No evidence of involvement with a myelinating process in the cervical  spine. Spondylosis changes are present as above, with moderate narrowing  of the right neural foramina at C3-4 and C4-5. The spinal canal is  patent.  Labs: musk, mog, nmo-neg.    LP 22 CSF 0 OCB. Protein-nml    EMGProgress Notes by Kem Scruggs MD (2022 13:15)  Left superficial peroneal sensory neuropathy at the ankle    Tremor  Bilat hand R>L, worse with activity, stress and fatigue. Slight vertical head tremor. Onset 3 years ago.    Hand Pain  Thenar emminence pain is constant but worse with movement, keeping her awake at night. Sharp in nature. Decreased sensation in bilat hands Not relieved with otc meds. Left hand w pressure. Int neck pain in right post scapular area.    Paresthesia  Symptoms better on Lyrica. Compliant and no side effects.  Bilat UE and LE w numbness, tingling, pins and needles. Interittent. Feels jittery like she needs to move. Heat does not help.  Onset 2021. EMG UE-nml    HA  Longstanding HA worse in last 2 years.  Freq: daily mild HA. 1-2 severe HA/week  Location: top of head  Quality:  Pressure from the inside, Center of head to holocranial.  Severity: 8/10. Has not been to ER  Duration: Daily HA is all day. Severe HA is 1-2 hours  Assoc sx: +nausea, -vomiting, +photophobia, + phonophobia, no vision changes, occasional low pitched ringing, not whooshing sound     Abortives:Excedrin, Tylenol, IBU  Preventatives:Cymbalta, metoprolol           Subjective      Past Medical History: History reviewed. No pertinent past medical history.    Past Surgical History:   Past Surgical History:   Procedure Laterality Date   • BREAST AUGMENTATION     • EXPLORATORY LAPAROTOMY         Family History:   Family History   Problem Relation Age of Onset   • Liver cancer Mother    • Heart disease Father    • Skin cancer Father    • No Known Problems Sister    • No Known Problems Brother    • Breast cancer Maternal Grandmother    • Colon cancer Maternal Grandmother    • Leukemia Maternal Grandmother    • No Known Problems Maternal Grandfather    • No Known Problems Paternal Grandmother    • No Known Problems Paternal Grandfather    • No Known Problems Daughter    • Hashimoto's thyroiditis Daughter        Social History:   Social History     Socioeconomic History   • Marital status:    Tobacco Use   • Smoking status: Never Smoker   • Smokeless tobacco: Never Used   Substance and Sexual Activity   • Alcohol use: Yes     Alcohol/week: 2.0 standard drinks     Types: 2 Glasses of wine per week     Comment: per year   • Drug use: Never   • Sexual activity: Yes       Medications:     Current Outpatient Medications:   •  DULoxetine (CYMBALTA) 60 MG capsule, TAKE 1 CAPSULE BY MOUTH ONCE DAILY DO NOT CRUSH OR CHEW, Disp: , Rfl:   •  pregabalin (Lyrica) 75 MG capsule, Take 1 capsule by mouth 2 (Two) Times a Day., Disp: 60 capsule, Rfl: 2  •  propranolol (INDERAL) 20 MG tablet, Take 1 tablet by mouth 2 (Two) Times a Day., Disp: 60 tablet, Rfl: 5    Allergies:   Allergies   Allergen Reactions   • Clindamycin Itching, Rash and  "Unknown (See Comments)   • Latex Itching     sensitivity       PHQ-9 Total Score:     STEADI Fall Risk Assessment has not been completed.    Objective     Physical Exam:   Physical Exam  Vitals reviewed.   Constitutional:       General: She is not in acute distress.     Appearance: She is well-developed.   HENT:      Head: Normocephalic and atraumatic.      Right Ear: External ear normal.      Left Ear: External ear normal.      Nose: Nose normal.   Eyes:      General: No scleral icterus.        Right eye: No discharge.         Left eye: No discharge.      Conjunctiva/sclera: Conjunctivae normal.   Cardiovascular:      Rate and Rhythm: Normal rate.   Pulmonary:      Effort: Pulmonary effort is normal. No respiratory distress.   Musculoskeletal:         General: No deformity.      Cervical back: Normal range of motion.   Skin:     General: Skin is warm and dry.   Neurological:      Mental Status: She is alert and oriented to person, place, and time.      Coordination: Coordination normal.   Psychiatric:         Behavior: Behavior normal.         Thought Content: Thought content normal.         Judgment: Judgment normal.         Neurologic Exam     Mental Status   Oriented to person, place, and time.        Vital Signs:   Vitals:    02/24/22 1016   BP: 130/98   Pulse: 65   Temp: 98.4 °F (36.9 °C)   TempSrc: Temporal   SpO2: 99%   Weight: 79.8 kg (176 lb)   Height: 167.6 cm (66\")     Body mass index is 28.41 kg/m².     Results:   Imaging:   MRI Brain With & Without Contrast    Result Date: 2/5/2022  Multifocal supratentorial T2 hyperintense white matter lesions, which while nonspecific raise concern for demyelinating process. There is no evidence of enhancement to suggest active demyelinating plaque.  No evidence of involvement with a myelinating process in the cervical spine. Spondylosis changes are present as above, with moderate narrowing of the right neural foramina at C3-4 and C4-5. The spinal canal is patent.   " This report was finalized on 2/5/2022 12:32 PM by Rajinder Ruby.      MRI Cervical Spine With & Without Contrast    Result Date: 2/5/2022  Multifocal supratentorial T2 hyperintense white matter lesions, which while nonspecific raise concern for demyelinating process. There is no evidence of enhancement to suggest active demyelinating plaque.  No evidence of involvement with a myelinating process in the cervical spine. Spondylosis changes are present as above, with moderate narrowing of the right neural foramina at C3-4 and C4-5. The spinal canal is patent.   This report was finalized on 2/5/2022 12:32 PM by Rajinder Ruby.      IR LUMBAR PUNCTURE DIAGNOSTIC    Result Date: 2/14/2022  Successful fluoroscopic guided lumbar puncture as above with no immediate complications.    This report was finalized on 2/14/2022 3:50 PM by Rajinder Ruby.           Assessment / Plan      Assessment/Plan:   Diagnoses and all orders for this visit:    1. White matter abnormality on MRI of brain (Primary)  Comments:  Repeat Jan 2023    2. Primary hypertension  -     propranolol (INDERAL) 20 MG tablet; Take 1 tablet by mouth 2 (Two) Times a Day.  Dispense: 60 tablet; Refill: 5    3. Intermittent tremor  -     propranolol (INDERAL) 20 MG tablet; Take 1 tablet by mouth 2 (Two) Times a Day.  Dispense: 60 tablet; Refill: 5    4. Numbness and tingling of both lower extremities  Comments:  Cont Duloxetine and Lyrica    5. Migraine without aura and without status migrainosus, not intractable  -     propranolol (INDERAL) 20 MG tablet; Take 1 tablet by mouth 2 (Two) Times a Day.  Dispense: 60 tablet; Refill: 5           Patient Education:     Reviewed medications, potential side effects and signs and symptoms to report. Discussed risk versus benefits of treatment plan with patient and/or family-including medications, labs and radiology that may be ordered. Addressed questions and concerns during visit. Patient and/or family verbalized  understanding and agree with plan. Instructed to call the office with any questions and report to ER with any life-threatening symptoms.     Follow Up:   Return in about 7 weeks (around 4/14/2022) for Recheck.    During this visit the following were done:  Labs Reviewed [x]    Labs Ordered []    Radiology Reports Reviewed [x]    Radiology Ordered []    PCP Records Reviewed []    Referring Provider Records Reviewed []    ER Records Reviewed []    Hospital Records Reviewed []    History Obtained From Family []    Radiology Images Reviewed [x]    Other Reviewed []    Records Requested []      Titi Méndez, DNP, APRN

## 2022-03-03 ENCOUNTER — SPECIALTY PHARMACY (OUTPATIENT)
Dept: ONCOLOGY | Facility: HOSPITAL | Age: 47
End: 2022-03-03

## 2022-03-03 RX ORDER — RIMEGEPANT SULFATE 75 MG/75MG
75 TABLET, ORALLY DISINTEGRATING ORAL EVERY OTHER DAY
Qty: 16 TABLET | Refills: 11 | Status: SHIPPED | OUTPATIENT
Start: 2022-03-03 | End: 2022-04-15 | Stop reason: SDUPTHER

## 2022-03-03 NOTE — PROGRESS NOTES
Specialty Pharmacy Refill Coordination Note     Obdulia is a 46 y.o. female starting on Nurtec QOD. Script sent to [facility] per patient's plan requirements.  Reviewed and verified with patient: Yes  Specialty medication(s) and dose(s) confirmed: yes                   Follow-up: 1 year     Edel Cassidy, Pharmacy Technician  Specialty Pharmacy Technician

## 2022-03-28 LAB
FUNGUS WND CULT: NORMAL
MYCOBACTERIUM SPEC CULT: NORMAL
NIGHT BLUE STAIN TISS: NORMAL

## 2022-04-15 ENCOUNTER — OFFICE VISIT (OUTPATIENT)
Dept: NEUROLOGY | Facility: CLINIC | Age: 47
End: 2022-04-15

## 2022-04-15 VITALS
HEIGHT: 66 IN | HEART RATE: 62 BPM | OXYGEN SATURATION: 98 % | WEIGHT: 172.6 LBS | DIASTOLIC BLOOD PRESSURE: 93 MMHG | TEMPERATURE: 97.3 F | BODY MASS INDEX: 27.74 KG/M2 | SYSTOLIC BLOOD PRESSURE: 146 MMHG

## 2022-04-15 DIAGNOSIS — Z79.899 HIGH RISK MEDICATION USE: ICD-10-CM

## 2022-04-15 DIAGNOSIS — R25.1 TREMOR OF BOTH HANDS: ICD-10-CM

## 2022-04-15 DIAGNOSIS — G43.009 MIGRAINE WITHOUT AURA AND WITHOUT STATUS MIGRAINOSUS, NOT INTRACTABLE: Primary | ICD-10-CM

## 2022-04-15 DIAGNOSIS — R20.2 PARESTHESIA: ICD-10-CM

## 2022-04-15 PROCEDURE — 99214 OFFICE O/P EST MOD 30 MIN: CPT | Performed by: NURSE PRACTITIONER

## 2022-04-15 RX ORDER — RIMEGEPANT SULFATE 75 MG/75MG
75 TABLET, ORALLY DISINTEGRATING ORAL EVERY OTHER DAY
Qty: 16 TABLET | Refills: 11 | Status: SHIPPED | OUTPATIENT
Start: 2022-04-15 | End: 2023-02-27

## 2022-04-15 RX ORDER — PREGABALIN 75 MG/1
75 CAPSULE ORAL 2 TIMES DAILY
Qty: 180 CAPSULE | Refills: 1 | Status: SHIPPED | OUTPATIENT
Start: 2022-04-15 | End: 2022-09-19 | Stop reason: SDUPTHER

## 2022-04-15 NOTE — PROGRESS NOTES
Neuro Office Visit      Encounter Date: 04/15/2022   Patient Name: Obdulia Ramesh  : 1975   MRN: [id number]   PCP: Dr Arango  Chief Complaint:    Chief Complaint   Patient presents with   • Numbness       History of Present Illness: Obdulia Ramesh is a 46 y.o. female who is here today in Neurology for tremor, numbness, na    Last visit 22 w me-Repeat MRI 2023, propranolol, cont duloxetine and lyrica.    HA  Onset years ago  Freq: 2 severe 10 mild HA/month  Location: crown  Quality: pressure  Severity: severe is 8/10, mild is 2/10  Duration: hours to days  Assoc sx: +nausea, -vomiting, +photophobia, + phonophobia, no vision changes, occasional low pitched ringing, not whooshing sound  Propranolol is effective.  Has severe nausea and requires ODT.  Will try PA again.    Abortives:Excedrin, Tylenol, IBU, Nurtec  Preventatives:Cymbalta, metoprolol, Nurtec     Tremor   Bilat hand tremor resolved on BB.   R>L, worse with activity, stress and fatigue. Slight vertical head tremor. Onset 3 years ago.    Paresthesia  Symptoms controlled on Lyrica and Duloxetine. Compliant with meds no side effects.  Bilat UE and LE w numbness, tingling, pins and needles. Intermittent. Feels jittery.  Onset 2021.   Progress Notes by Kem Scruggs MD (2022 13:15)    White matter disease  MRI Brain With & Without Contrast (2022 10:42)  LP 22 CSF 0 OCB. Protein-nml  Labs: musk, mog, nmo-neg.  Repeat MRI brain 2023.      Subjective      Past Medical History:   Past Medical History:   Diagnosis Date   • HTN (hypertension) 2019   • Hypertrophy of fat pad 2019   • Intermittent tremor 2020   • Migraine without aura and without status migrainosus, not intractable 4/15/2022   • Numbness and tingling of both lower extremities 2022       Past Surgical History:   Past Surgical History:   Procedure Laterality Date   • BREAST AUGMENTATION     • EXPLORATORY LAPAROTOMY         Family  History:   Family History   Problem Relation Age of Onset   • Liver cancer Mother    • Heart disease Father    • Skin cancer Father    • No Known Problems Sister    • No Known Problems Brother    • Breast cancer Maternal Grandmother    • Colon cancer Maternal Grandmother    • Leukemia Maternal Grandmother    • No Known Problems Maternal Grandfather    • No Known Problems Paternal Grandmother    • No Known Problems Paternal Grandfather    • No Known Problems Daughter    • Hashimoto's thyroiditis Daughter        Social History:   Social History     Socioeconomic History   • Marital status:    Tobacco Use   • Smoking status: Never Smoker   • Smokeless tobacco: Never Used   Substance and Sexual Activity   • Alcohol use: Yes     Alcohol/week: 2.0 standard drinks     Types: 2 Glasses of wine per week     Comment: per year   • Drug use: Never   • Sexual activity: Yes       Medications:     Current Outpatient Medications:   •  DULoxetine (CYMBALTA) 60 MG capsule, TAKE 1 CAPSULE BY MOUTH ONCE DAILY DO NOT CRUSH OR CHEW, Disp: , Rfl:   •  pregabalin (Lyrica) 75 MG capsule, Take 1 capsule by mouth 2 (Two) Times a Day., Disp: 180 capsule, Rfl: 1  •  propranolol (INDERAL) 20 MG tablet, Take 1 tablet by mouth 2 (Two) Times a Day., Disp: 60 tablet, Rfl: 5  •  Rimegepant Sulfate (Nurtec) 75 MG tablet dispersible tablet, Take 1 tablet by mouth Every Other Day., Disp: 16 tablet, Rfl: 11    Allergies:   Allergies   Allergen Reactions   • Clindamycin Itching, Rash and Unknown (See Comments)   • Latex Itching     sensitivity       PHQ-9 Total Score:     STEADI Fall Risk Assessment has not been completed.    Objective     Physical Exam:   Physical Exam  Eyes:      Pupils: Pupils are equal, round, and reactive to light.   Neurological:      Mental Status: She is oriented to person, place, and time.      Coordination: Finger-Nose-Finger Test, Heel to Shin Test and Romberg Test normal.      Gait: Gait is intact.      Deep Tendon  Reflexes:      Reflex Scores:       Tricep reflexes are 2+ on the right side and 2+ on the left side.       Bicep reflexes are 2+ on the right side and 2+ on the left side.       Brachioradialis reflexes are 2+ on the right side and 2+ on the left side.       Patellar reflexes are 2+ on the right side and 2+ on the left side.       Achilles reflexes are 2+ on the right side and 2+ on the left side.  Psychiatric:         Speech: Speech normal.         Neurologic Exam     Mental Status   Oriented to person, place, and time.   Follows 3 step commands.   Attention: normal. Concentration: normal.   Speech: speech is normal   Level of consciousness: alert  Knowledge: consistent with education.   Normal comprehension.     Cranial Nerves     CN III, IV, VI   Pupils are equal, round, and reactive to light.  Right pupil: Accommodation: intact.   Left pupil: Accommodation: intact.   CN III: no CN III palsy  CN VI: no CN VI palsy  Nystagmus: none   Diplopia: none  Upgaze: normal  Downgaze: normal  Conjugate gaze: present    CN VII   Facial expression full, symmetric.     CN VIII   Hearing: intact    CN XII   CN XII normal.     Motor Exam   Muscle bulk: normal  Overall muscle tone: normal    Strength   Right biceps: 5/5  Left biceps: 5/5  Right triceps: 5/5  Left triceps: 5/5  Right interossei: 5/5  Left interossei: 5/5  Right quadriceps: 5/5  Left quadriceps: 5/5  Right anterior tibial: 5/5  Left anterior tibial: 5/5  Right posterior tibial: 5/5  Left posterior tibial: 5/5    Sensory Exam   Light touch normal.     Gait, Coordination, and Reflexes     Gait  Gait: normal    Coordination   Romberg: negative  Finger to nose coordination: normal  Heel to shin coordination: normal    Tremor   Resting tremor: absent  Action tremor: absent    Reflexes   Right brachioradialis: 2+  Left brachioradialis: 2+  Right biceps: 2+  Left biceps: 2+  Right triceps: 2+  Left triceps: 2+  Right patellar: 2+  Left patellar: 2+  Right achilles:  "2+  Left achilles: 2+  Right : 2+  Left : 2+       Vital Signs:   Vitals:    04/15/22 1023   BP: 146/93   Pulse: 62   Temp: 97.3 °F (36.3 °C)   SpO2: 98%   Weight: 78.3 kg (172 lb 9.6 oz)   Height: 167.6 cm (65.98\")     Body mass index is 27.87 kg/m².     Results:   Imaging:   MRI Brain With & Without Contrast    Result Date: 2/5/2022  Multifocal supratentorial T2 hyperintense white matter lesions, which while nonspecific raise concern for demyelinating process. There is no evidence of enhancement to suggest active demyelinating plaque.  No evidence of involvement with a myelinating process in the cervical spine. Spondylosis changes are present as above, with moderate narrowing of the right neural foramina at C3-4 and C4-5. The spinal canal is patent.   This report was finalized on 2/5/2022 12:32 PM by Rajinder Ruby.      MRI Cervical Spine With & Without Contrast    Result Date: 2/5/2022  Multifocal supratentorial T2 hyperintense white matter lesions, which while nonspecific raise concern for demyelinating process. There is no evidence of enhancement to suggest active demyelinating plaque.  No evidence of involvement with a myelinating process in the cervical spine. Spondylosis changes are present as above, with moderate narrowing of the right neural foramina at C3-4 and C4-5. The spinal canal is patent.   This report was finalized on 2/5/2022 12:32 PM by Rajinder Ruby.      IR LUMBAR PUNCTURE DIAGNOSTIC    Result Date: 2/14/2022  Successful fluoroscopic guided lumbar puncture as above with no immediate complications.    This report was finalized on 2/14/2022 3:50 PM by Rajinder Ruby.           Assessment / Plan      Assessment/Plan:   Diagnoses and all orders for this visit:    1. Migraine without aura and without status migrainosus, not intractable (Primary)  Comments:  Cont propranolol, add Nurtec. ODT required due to nausea and vomiting.  Orders:  -     Rimegepant Sulfate (Nurtec) 75 MG tablet " dispersible tablet; Take 1 tablet by mouth Every Other Day.  Dispense: 16 tablet; Refill: 11    2. High risk medication use  -     Urine Drug Screen - Urine, Clean Catch; Future    3. Paresthesia  Comments:  Cont Lyrica and Duloxetine.  Orders:  -     pregabalin (Lyrica) 75 MG capsule; Take 1 capsule by mouth 2 (Two) Times a Day.  Dispense: 180 capsule; Refill: 1    4. Tremor of both hands  Comments:  Cont propranolol  Orders:  -     pregabalin (Lyrica) 75 MG capsule; Take 1 capsule by mouth 2 (Two) Times a Day.  Dispense: 180 capsule; Refill: 1          As a part of this patient's therapy a controlled substance was prescribed. Instructed on the safe and proper use of this medication along with potential risks. Controlled substance contract signed and scanned. Bernard will be reviewed and UDS obtained as indicated.        Patient Education:     Reviewed medications, potential side effects and signs and symptoms to report. Discussed risk versus benefits of treatment plan with patient and/or family-including medications, labs and radiology that may be ordered. Addressed questions and concerns during visit. Patient and/or family verbalized understanding and agree with plan. Instructed to call the office with any questions and report to ER with any life-threatening symptoms.     Follow Up:   Return in about 6 months (around 10/15/2022) for Recheck.    During this visit the following were done:  Labs Reviewed []    Labs Ordered []    Radiology Reports Reviewed []    Radiology Ordered []    PCP Records Reviewed []    Referring Provider Records Reviewed []    ER Records Reviewed []    Hospital Records Reviewed []    History Obtained From Family []    Radiology Images Reviewed []    Other Reviewed [x]    Records Requested []       Titi Méndez, MEHRAN, APRN

## 2022-04-19 ENCOUNTER — DOCUMENTATION (OUTPATIENT)
Dept: ONCOLOGY | Facility: HOSPITAL | Age: 47
End: 2022-04-19

## 2022-09-19 DIAGNOSIS — R25.1 TREMOR OF BOTH HANDS: ICD-10-CM

## 2022-09-19 DIAGNOSIS — R20.2 PARESTHESIA: ICD-10-CM

## 2022-09-20 RX ORDER — PREGABALIN 75 MG/1
75 CAPSULE ORAL 2 TIMES DAILY
Qty: 180 CAPSULE | Refills: 1 | Status: SHIPPED | OUTPATIENT
Start: 2022-09-20 | End: 2023-09-20

## 2022-09-20 NOTE — TELEPHONE ENCOUNTER
Rx Refill Note  Requested Prescriptions     Pending Prescriptions Disp Refills   • pregabalin (Lyrica) 75 MG capsule 180 capsule 1     Sig: Take 1 capsule by mouth 2 (Two) Times a Day.      Last filled: 04/15/2022 180 with 1 refill.  Last office visit with prescribing clinician: 4/15/2022      Next office visit with prescribing clinician: Visit date not found     Shreya Reza MA  09/20/22, 07:28 EDT

## 2023-01-23 ENCOUNTER — OFFICE VISIT (OUTPATIENT)
Dept: NEUROLOGY | Facility: CLINIC | Age: 48
End: 2023-01-23
Payer: OTHER GOVERNMENT

## 2023-01-23 VITALS
DIASTOLIC BLOOD PRESSURE: 100 MMHG | BODY MASS INDEX: 25.3 KG/M2 | TEMPERATURE: 97.8 F | HEART RATE: 55 BPM | WEIGHT: 157.4 LBS | OXYGEN SATURATION: 98 % | HEIGHT: 66 IN | SYSTOLIC BLOOD PRESSURE: 128 MMHG

## 2023-01-23 DIAGNOSIS — Z79.899 HIGH RISK MEDICATION USE: ICD-10-CM

## 2023-01-23 DIAGNOSIS — R90.82 WHITE MATTER ABNORMALITY ON MRI OF BRAIN: Primary | ICD-10-CM

## 2023-01-23 DIAGNOSIS — G43.009 MIGRAINE WITHOUT AURA AND WITHOUT STATUS MIGRAINOSUS, NOT INTRACTABLE: ICD-10-CM

## 2023-01-23 DIAGNOSIS — I10 PRIMARY HYPERTENSION: ICD-10-CM

## 2023-01-23 DIAGNOSIS — R40.0 DAYTIME SOMNOLENCE: ICD-10-CM

## 2023-01-23 DIAGNOSIS — G47.33 OSA (OBSTRUCTIVE SLEEP APNEA): ICD-10-CM

## 2023-01-23 DIAGNOSIS — R20.0 NUMBNESS AND TINGLING OF BOTH LOWER EXTREMITIES: ICD-10-CM

## 2023-01-23 DIAGNOSIS — R06.83 SNORING: ICD-10-CM

## 2023-01-23 DIAGNOSIS — R20.2 NUMBNESS AND TINGLING OF BOTH LOWER EXTREMITIES: ICD-10-CM

## 2023-01-23 DIAGNOSIS — Z79.899 CONTROLLED SUBSTANCE AGREEMENT SIGNED: ICD-10-CM

## 2023-01-23 PROBLEM — D25.1 INTRAMURAL LEIOMYOMA OF UTERUS: Status: ACTIVE | Noted: 2022-11-29

## 2023-01-23 PROBLEM — N92.6 IRREGULAR PERIODS: Status: ACTIVE | Noted: 2022-11-29

## 2023-01-23 PROBLEM — N92.1 MENOMETRORRHAGIA: Status: ACTIVE | Noted: 2019-02-04

## 2023-01-23 PROCEDURE — 99214 OFFICE O/P EST MOD 30 MIN: CPT | Performed by: NURSE PRACTITIONER

## 2023-01-23 RX ORDER — PROGESTERONE 200 MG/1
CAPSULE ORAL
COMMUNITY
Start: 2023-01-17

## 2023-01-23 RX ORDER — PROPRANOLOL HYDROCHLORIDE 40 MG/1
40 TABLET ORAL 2 TIMES DAILY
COMMUNITY
Start: 2023-01-05

## 2023-01-23 RX ORDER — AMLODIPINE BESYLATE 5 MG/1
5 TABLET ORAL DAILY
Qty: 30 TABLET | Refills: 11 | Status: SHIPPED | OUTPATIENT
Start: 2023-01-23 | End: 2024-01-23

## 2023-01-23 NOTE — PROGRESS NOTES
Neuro Office Visit      Encounter Date: 2023   Patient Name: Obdulia Ramesh  : 1975   MRN: [id number]   PCP: DR Arango  Chief Complaint:    Chief Complaint   Patient presents with   • Migraine       History of Present Illness: Obdulia Ramesh is a 47 y.o. female who is here today in Neurology for migraines, paresthesia, tremor, white matter disease.    Last visit 4/15/2022 w me-Cont propranolol and nurtec QOD, Lyrica w Duloxetine. UDS    Interval Hx: getting , exercising.      Migraine  Taking propranolol 40 bid.  Stopped the Nurtec due to $75 copay.    Onset years ago  Freq: 7 severe daily mild HA/month  Location: crown  Quality: pressure  Severity: severe is 8/10, mild is 2/10  Duration: hours to days  Assoc sx: +nausea, -vomiting, +photophobia, + phonophobia, no vision changes, occasional low pitched ringing, not whooshing sound    Has severe nausea and requires ODT.       Abortives:Excedrin, Tylenol, IBU, Nurtec  Preventatives:Cymbalta, Lyrica, metoprolol, Nurtec        Tremor  Bilat hand tremor resolved on BB.   R>L, worse with activity, stress and fatigue. Slight vertical head tremor. Onset 3 years ago.        Paresthesia  Symptoms controlled on Lyrica and Duloxetine. Compliant with meds no side effects.  Bilat UE and LE w numbness, tingling, pins and needles. Intermittent. Feels jittery.  Onset 2021.   Progress Notes by Kem Scruggs MD (2022 13:15)     Conclusion: This study showed neurophysiologic evidence of a left superficial peroneal sensory neuropathy at the ankle.    White Matter disease  MRI Brain With & Without Contrast (2022 10:42)  LP 22 CSF 0 OCB. Protein-nml  Labs: musk, mog, nmo-neg.  Repeat MRI brain 2023.      High Risk medication use/ CSC  Taking Lyrica for       HTN  Has had flushing on lisinopril and HCTZ. Taking propranolol.    Daytime Somnolence/ML  Previously had sleep study. Used a CPAP but her  didn't like it. She  stopped using it.  Falling asleep at work. Sleeping well at night 6-8 hours.  Feels like she can't keep her eyes open. Only for a 2 minutes.  No known seizure activity.  She jerks and wakes herself up.      Neck pain  Has increased pain in neck. Treats with IBU with good results. Has some tightness in arms. Occasional numbness      PMH: HTN,   Subjective      Past Medical History:   Past Medical History:   Diagnosis Date   • Headache, tension-type    • HTN (hypertension) 04/11/2019   • Hypertrophy of fat pad 04/11/2019   • Intermittent tremor 07/13/2020   • Migraine without aura and without status migrainosus, not intractable 04/15/2022   • Numbness and tingling of both lower extremities 02/21/2022       Past Surgical History:   Past Surgical History:   Procedure Laterality Date   • BREAST AUGMENTATION     • EXPLORATORY LAPAROTOMY         Family History:   Family History   Problem Relation Age of Onset   • Liver cancer Mother    • Heart disease Father    • Skin cancer Father    • No Known Problems Sister    • No Known Problems Brother    • Breast cancer Maternal Grandmother    • Colon cancer Maternal Grandmother    • Leukemia Maternal Grandmother    • No Known Problems Maternal Grandfather    • Dementia Paternal Grandmother    • No Known Problems Paternal Grandfather    • No Known Problems Daughter    • Hashimoto's thyroiditis Daughter        Social History:   Social History     Socioeconomic History   • Marital status:    Tobacco Use   • Smoking status: Never   • Smokeless tobacco: Never   Substance and Sexual Activity   • Alcohol use: Yes     Alcohol/week: 2.0 standard drinks     Types: 2 Glasses of wine per week     Comment: per year   • Drug use: Never   • Sexual activity: Yes     Partners: Male     Birth control/protection: Implant, None     Comment: Nexplanon       Medications:     Current Outpatient Medications:   •  DULoxetine (CYMBALTA) 60 MG capsule, TAKE 1 CAPSULE BY MOUTH ONCE DAILY DO NOT CRUSH  OR CHEW, Disp: , Rfl:   •  Etonogestrel (NEXPLANON) 68 MG implant subdermal implant, 1 each by Other route., Disp: , Rfl:   •  pregabalin (Lyrica) 75 MG capsule, Take 1 capsule by mouth 2 (Two) Times a Day., Disp: 180 capsule, Rfl: 1  •  Progesterone (PROMETRIUM) 200 MG capsule, , Disp: , Rfl:   •  propranolol (INDERAL) 40 MG tablet, Take 40 mg by mouth 2 (Two) Times a Day., Disp: , Rfl:   •  Rimegepant Sulfate (Nurtec) 75 MG tablet dispersible tablet, Take 1 tablet by mouth Every Other Day., Disp: 16 tablet, Rfl: 11  •  amLODIPine (NORVASC) 5 MG tablet, Take 1 tablet by mouth Daily., Disp: 30 tablet, Rfl: 11    Allergies:   Allergies   Allergen Reactions   • Clindamycin Itching, Rash and Unknown (See Comments)   • Latex Itching     sensitivity       PHQ-9 Total Score:     [facility].    Objective     Physical Exam:   Physical Exam  Neurological:      Mental Status: She is oriented to person, place, and time.      Gait: Gait is intact.      Deep Tendon Reflexes:      Reflex Scores:       Tricep reflexes are 2+ on the right side and 2+ on the left side.       Bicep reflexes are 2+ on the right side and 2+ on the left side.       Brachioradialis reflexes are 2+ on the right side and 2+ on the left side.       Patellar reflexes are 2+ on the right side and 2+ on the left side.       Achilles reflexes are 2+ on the right side and 2+ on the left side.  Psychiatric:         Speech: Speech normal.         Neurologic Exam     Mental Status   Oriented to person, place, and time.   Follows 3 step commands.   Attention: normal. Concentration: normal.   Speech: speech is normal   Level of consciousness: alert  Knowledge: consistent with education.   Normal comprehension.     Cranial Nerves     CN III, IV, VI   Right pupil: Accommodation: intact.   Left pupil: Accommodation: intact.   CN III: no CN III palsy  CN VI: no CN VI palsy  Nystagmus: none   Diplopia: none  Upgaze:  "normal  Downgaze: normal  Conjugate gaze: present    CN VII   Facial expression full, symmetric.     CN VIII   Hearing: intact    Motor Exam   Muscle bulk: normal  Overall muscle tone: normal    Strength   Right biceps: 5/5  Left biceps: 5/5  Right triceps: 5/5  Left triceps: 5/5  Right interossei: 5/5  Left interossei: 5/5  Right quadriceps: 5/5  Left quadriceps: 5/5  Right anterior tibial: 5/5  Left anterior tibial: 5/5  Right posterior tibial: 5/5  Left posterior tibial: 5/5    Sensory Exam   Right arm light touch: normal  Left arm light touch: normal  Right leg light touch: decreased from toes  Left leg light touch: normal    Gait, Coordination, and Reflexes     Gait  Gait: normal    Tremor   Resting tremor: absent  Action tremor: absent    Reflexes   Right brachioradialis: 2+  Left brachioradialis: 2+  Right biceps: 2+  Left biceps: 2+  Right triceps: 2+  Left triceps: 2+  Right patellar: 2+  Left patellar: 2+  Right achilles: 2+  Left achilles: 2+  Right : 2+  Left : 2+       Vital Signs:   Vitals:    01/23/23 1259   BP: 128/100   Pulse: 55   Temp: 97.8 °F (36.6 °C)   SpO2: 98%   Weight: 71.4 kg (157 lb 6.4 oz)   Height: 167.6 cm (65.98\")     Body mass index is 25.42 kg/m².         Assessment / Plan      Assessment/Plan:   Diagnoses and all orders for this visit:    1. White matter abnormality on MRI of brain (Primary)  -     MRI Brain With & Without Contrast; Future    2. Numbness and tingling of both lower extremities  -     MRI Brain With & Without Contrast; Future    3. High risk medication use    4. Migraine without aura and without status migrainosus, not intractable  Comments:  Samples of Qulipta and Ubrelvy. Sent message to pharmacy team.  Orders:  -     MRI Brain With & Without Contrast; Future  -     amLODIPine (NORVASC) 5 MG tablet; Take 1 tablet by mouth Daily.  Dispense: 30 tablet; Refill: 11    5. Primary hypertension  Comments:  On propranolol.  Orders:  -     amLODIPine (NORVASC) 5 MG " tablet; Take 1 tablet by mouth Daily.  Dispense: 30 tablet; Refill: 11    6. Daytime somnolence  -     Ambulatory Referral to Sleep Medicine    7. ML (obstructive sleep apnea)  -     Ambulatory Referral to Sleep Medicine    8. Snoring  -     Ambulatory Referral to Sleep Medicine    9. Controlled substance agreement signed           Patient Education:    As a part of this patient's therapy a controlled substance was prescribed. Instructed on the safe and proper use of this medication along with potential risks. Controlled substance contract signed and scanned. Bernard will be reviewed and UDS obtained as indicated.        Reviewed medications, potential side effects and signs and symptoms to report. Discussed risk versus benefits of treatment plan with patient and/or family-including medications, labs and radiology that may be ordered. Addressed questions and concerns during visit. Patient and/or family verbalized understanding and agree with plan. Instructed to call the office with any questions and report to ER with any life-threatening symptoms.     Follow Up:   Return in about 3 months (around 4/23/2023) for Recheck.    During this visit the following were done:  Labs Reviewed [x]    Labs Ordered []    Radiology Reports Reviewed [x]    Radiology Ordered [x]    PCP Records Reviewed []    Referring Provider Records Reviewed []    ER Records Reviewed []    Hospital Records Reviewed []    History Obtained From Family []    Radiology Images Reviewed []    Other Reviewed [x]    Records Requested [x]      Titi Méndez, MEHRAN, APRN

## 2023-02-07 NOTE — PROGRESS NOTES
"Sleep Clinic Video Visit Consult Note    You have chosen to receive care through a telehealth visit.  Do you consent to use a video/audio connection for your medical care today? Yes     Chief Complaint  Reestablish care for sleep medicine and history of sleep apnea    Subjective     History of Present Illness:  Obdulia Ramesh is a 47 y.o. female with a history of HTN, migraines, and intermittent tremor, white matter disease.  Patient is referred by Titi Méndez DNP with neurology.  Patient has had difficulty with ongoing daytime somnolence.  She did have a sleep study previously and used CPAP but \"her  did not like it\" and she stopped using it.  Per questionnaire the patient reports snoring, witnessed apnea, daytime sleepiness fatigue, frequent awakening, nonrestorative sleep, dry mouth, airway abnormalities, leg and body jerks, difficulty staying asleep, night sweats, memory loss, lack of concentration, and uncontrollable need to sleep.  This has been ongoing for 4-5 years.  She has to go to her car at times to take a 15-minute rest break and has found herself falling asleep at her desk.  She last had a sleep study in [place] in approximately 2018.  Patient typically goes to bed at 9 PM waking at 5 AM on weekdays, and 1 AM waking at 7:30 AM on weekends.  She denies use of tobacco, drinks alcohol 2-3 times per week, and has used marijuana.  Patient drinks regular coffee-1 to 2 cups, and decaf cola 1-2 daily.    Further details are as follows:    McIntosh Scale is (out of 24): Total score: 10     Estimated average amount of sleep per night: 6  Number of times she wakes up at night: 4  Difficulty falling back asleep: yes  It usually takes 5 minutes to go to sleep.  She feels sleepy upon waking up: yes  Rotating or night shift work: no    Drowsiness/Sleepiness:  She exhibits the following:  excessive daytime sleepiness  excessive daytime fatigue  falls asleep watching TV    Snoring/Breathing:  She " "exhibits the following:  loud snoring  snores in all sleep positions  awoken with dry mouth  quits breathing at night  awakens gasping for breath  trouble breathing through nose at night  morning headaches    Head Injury:  She exhibits the following:  Yes. Type: Concussion 2013    Reflux:  She describes the following:  Only if she eats late at night    Narcolepsy:  She exhibits the following:  occasionaly auditory hallucinations    RLS/PLMs:  She describes the following:  moves or jerks during sleep  discomfort in legs with an urge to move them    Insomnia:  She describes the following:  frequent awakenings  restless sleep    Parasomnia:  She exhibits the following:  excessive sweating while sleeping    Weight:  155 lb    The patient's relevant past medical, surgical, family, and social history reviewed and updated in Epic as appropriate.    Review of Systems   Constitutional: Negative.    HENT: Negative.    Eyes: Negative.    Respiratory: Negative.    Cardiovascular: Negative.    Gastrointestinal: Negative.    Endocrine: Negative.    Genitourinary: Negative.    Musculoskeletal: Negative.    Skin: Negative.    Allergic/Immunologic: Negative.    Neurological: Negative.    Hematological: Negative.    Psychiatric/Behavioral: Negative.    All other systems reviewed and are negative.      Objective   Vital Signs:   Ht 167.6 cm (66\")   Wt 70.3 kg (155 lb)   BMI 25.02 kg/m²           Physical Exam  Constitutional:       General: She is not in acute distress.     Appearance: Normal appearance.   Neurological:      Mental Status: She is alert and oriented to person, place, and time.   Psychiatric:         Mood and Affect: Mood normal.         Behavior: Behavior normal.             Result Review :              Assessment and Plan  Obdulia Ramesh is a 47 y.o. female who presents for further evaluation of excessive daytime and sleepiness and fatigue, nonrestorative sleep, and concerns for sleep disordered breathing and " obstructive sleep apnea.  Patient previously had a sleep study approximately 2018 in which she was found with sleep apnea.  PAP therapy had been initiated but she discontinued use.  We will obtain a home sleep test for further evaluation, and to reestablish diagnosis.  I anticipate ordering a new device and supplies after test.  Patient was advised that she would need to return for follow-up in compliance in 31-90 days after initiation of new device.    Diagnoses and all orders for this visit:    1. Snoring (Primary)  -     Home Sleep Study; Future    2. Suspected sleep apnea  -     Home Sleep Study; Future    3. Excessive daytime sleepiness  -     Home Sleep Study; Future                 I discussed the consequences of uncontrolled sleep apnea including hypertension, heart disease, diabetes, stroke, and dementia. I further discussed sleep apnea therapeutic options including CPAP, Weight loss, Oral dental appliance, and surgery.         Follow Up  Return for Follow up after study.  Patient was given instructions and counseling regarding her condition or for health maintenance advice. Please see specific information pulled into the AVS if appropriate.     VINI Evans, ACNP-BC  Pulmonary, Critical Care Medicine, and Sleep Medicine

## 2023-02-08 ENCOUNTER — TELEMEDICINE (OUTPATIENT)
Dept: SLEEP MEDICINE | Facility: HOSPITAL | Age: 48
End: 2023-02-08
Payer: OTHER GOVERNMENT

## 2023-02-08 VITALS — WEIGHT: 155 LBS | HEIGHT: 66 IN | BODY MASS INDEX: 24.91 KG/M2

## 2023-02-08 DIAGNOSIS — G47.19 EXCESSIVE DAYTIME SLEEPINESS: ICD-10-CM

## 2023-02-08 DIAGNOSIS — R29.818 SUSPECTED SLEEP APNEA: ICD-10-CM

## 2023-02-08 DIAGNOSIS — R06.83 SNORING: Primary | ICD-10-CM

## 2023-02-08 PROCEDURE — 99213 OFFICE O/P EST LOW 20 MIN: CPT | Performed by: NURSE PRACTITIONER

## 2023-02-23 ENCOUNTER — HOSPITAL ENCOUNTER (OUTPATIENT)
Dept: MRI IMAGING | Facility: HOSPITAL | Age: 48
Discharge: HOME | End: 2023-02-23
Admitting: NURSE PRACTITIONER
Payer: OTHER GOVERNMENT

## 2023-02-23 DIAGNOSIS — R20.0 NUMBNESS AND TINGLING OF BOTH LOWER EXTREMITIES: ICD-10-CM

## 2023-02-23 DIAGNOSIS — G43.009 MIGRAINE WITHOUT AURA AND WITHOUT STATUS MIGRAINOSUS, NOT INTRACTABLE: ICD-10-CM

## 2023-02-23 DIAGNOSIS — R20.2 NUMBNESS AND TINGLING OF BOTH LOWER EXTREMITIES: ICD-10-CM

## 2023-02-23 DIAGNOSIS — R90.82 WHITE MATTER ABNORMALITY ON MRI OF BRAIN: ICD-10-CM

## 2023-02-23 PROCEDURE — 70553 MRI BRAIN STEM W/O & W/DYE: CPT

## 2023-02-23 PROCEDURE — A9577 INJ MULTIHANCE: HCPCS | Performed by: NURSE PRACTITIONER

## 2023-02-23 PROCEDURE — 0 GADOBENATE DIMEGLUMINE 529 MG/ML SOLUTION: Performed by: NURSE PRACTITIONER

## 2023-02-23 RX ADMIN — GADOBENATE DIMEGLUMINE 15 ML: 529 INJECTION, SOLUTION INTRAVENOUS at 11:02

## 2023-02-27 ENCOUNTER — TELEPHONE (OUTPATIENT)
Dept: NEUROLOGY | Facility: CLINIC | Age: 48
End: 2023-02-27
Payer: OTHER GOVERNMENT

## 2023-02-27 ENCOUNTER — SPECIALTY PHARMACY (OUTPATIENT)
Dept: ONCOLOGY | Facility: HOSPITAL | Age: 48
End: 2023-02-27
Payer: OTHER GOVERNMENT

## 2023-02-27 RX ORDER — ATOGEPANT 60 MG/1
60 TABLET ORAL DAILY
Qty: 30 TABLET | Refills: 11 | Status: SHIPPED | OUTPATIENT
Start: 2023-02-27

## 2023-02-27 NOTE — TELEPHONE ENCOUNTER
Called patient and gave results.  Patient was understanding and appreciative.    ----- Message from Titi Méndez DNP, APRN sent at 2/24/2023  5:36 PM EST -----  Please notify pt MRI of brain is stable with no new or enlarging lesions.

## 2023-04-26 DIAGNOSIS — R20.2 PARESTHESIA: ICD-10-CM

## 2023-04-26 DIAGNOSIS — R25.1 TREMOR OF BOTH HANDS: ICD-10-CM

## 2023-04-26 RX ORDER — PREGABALIN 75 MG/1
75 CAPSULE ORAL 2 TIMES DAILY
Qty: 180 CAPSULE | Refills: 1 | Status: SHIPPED | OUTPATIENT
Start: 2023-04-26 | End: 2024-04-25

## 2023-04-26 NOTE — TELEPHONE ENCOUNTER
Rx Refill Note  Requested Prescriptions     Pending Prescriptions Disp Refills   • pregabalin (Lyrica) 75 MG capsule 180 capsule 1     Sig: Take 1 capsule by mouth 2 (Two) Times a Day.      Last filled: 09/20/2022, 180 with 1 Refill.  Last office visit with prescribing clinician: Visit date not found   Next office visit with prescribing clinician: Visit date not found     Tereza Melendrez MA  04/26/23, 14:34 EDT

## 2023-05-11 ENCOUNTER — HOSPITAL ENCOUNTER (OUTPATIENT)
Dept: SLEEP MEDICINE | Facility: HOSPITAL | Age: 48
End: 2023-05-11
Payer: OTHER GOVERNMENT

## 2023-05-11 VITALS — BODY MASS INDEX: 24.91 KG/M2 | HEIGHT: 66 IN | WEIGHT: 154.98 LBS

## 2023-05-11 DIAGNOSIS — R06.83 SNORING: ICD-10-CM

## 2023-05-11 DIAGNOSIS — R29.818 SUSPECTED SLEEP APNEA: ICD-10-CM

## 2023-05-11 DIAGNOSIS — G47.19 EXCESSIVE DAYTIME SLEEPINESS: ICD-10-CM

## 2023-05-11 PROCEDURE — 95806 SLEEP STUDY UNATT&RESP EFFT: CPT

## 2023-05-15 DIAGNOSIS — G47.33 OSA (OBSTRUCTIVE SLEEP APNEA): Primary | ICD-10-CM

## 2023-05-17 NOTE — PROGRESS NOTES
CALLED PATIENT AND ADVISED OF STUDY RESULTS. PATIENT VERBALIZED UNDERSTANDING AND WAS AGREEABLE TO PAP THERAPY. FAXED ORDER TO [facility] 05/17/23 [facility]

## 2023-06-13 ENCOUNTER — OFFICE VISIT (OUTPATIENT)
Dept: NEUROLOGY | Facility: CLINIC | Age: 48
End: 2023-06-13
Payer: OTHER GOVERNMENT

## 2023-06-13 VITALS
HEIGHT: 66 IN | OXYGEN SATURATION: 99 % | BODY MASS INDEX: 26.58 KG/M2 | SYSTOLIC BLOOD PRESSURE: 128 MMHG | HEART RATE: 58 BPM | WEIGHT: 165.4 LBS | DIASTOLIC BLOOD PRESSURE: 76 MMHG

## 2023-06-13 DIAGNOSIS — G43.009 MIGRAINE WITHOUT AURA AND WITHOUT STATUS MIGRAINOSUS, NOT INTRACTABLE: ICD-10-CM

## 2023-06-13 DIAGNOSIS — R20.2 NUMBNESS AND TINGLING OF BOTH LOWER EXTREMITIES: Chronic | ICD-10-CM

## 2023-06-13 DIAGNOSIS — M54.2 NECK PAIN: ICD-10-CM

## 2023-06-13 DIAGNOSIS — R20.2 PARESTHESIA: Primary | ICD-10-CM

## 2023-06-13 DIAGNOSIS — R25.1 TREMOR: ICD-10-CM

## 2023-06-13 DIAGNOSIS — R20.0 NUMBNESS AND TINGLING OF BOTH LOWER EXTREMITIES: Chronic | ICD-10-CM

## 2023-06-13 DIAGNOSIS — R29.898 WEAKNESS OF BOTH HANDS: ICD-10-CM

## 2023-06-13 PROBLEM — G60.9 IDIOPATHIC PERIPHERAL NEUROPATHY: Status: ACTIVE | Noted: 2023-06-02

## 2023-06-13 PROBLEM — L98.9 CHANGING SKIN LESION: Status: ACTIVE | Noted: 2023-06-02

## 2023-06-13 PROCEDURE — 99214 OFFICE O/P EST MOD 30 MIN: CPT | Performed by: NURSE PRACTITIONER

## 2023-06-13 NOTE — PROGRESS NOTES
Neuro Office Visit      Encounter Date: 2023   Patient Name: Obdulia Ramesh  : 1975   MRN: [id number]   PCP: Dr Arango  Chief Complaint:    Chief Complaint   Patient presents with   • White abnormality on MRI of brain       History of Present Illness: Obdulia Ramesh is a 47 y.o. female who is here today in Neurology for  migraine, tremor, paresthesia, white matter disease, daytime somnolence    Last visit 2023 w me-MRI brain, samples of qulipta and ubrelvy, cont propranolol and amlodipine, refer to sleep med.      MRI Brain With & Without Contrast (2023 11:19)-no new or enlarging lesions. Stable    Neck pain w paresthesia  Has increased pain in neck that is worse. Treats with  mg bid with good results but never relieved. Has some tightness in arms. Numbness and tingling in bilat fingerips. All fingers involved. She is dropping items. Feels arms are worse then legs.      Migraine  Migraines are controlled.  Taking propranolol 40 bid.  Stopped the Nurtec due to $75 copay.     Onset years ago  Freq: 7 severe daily mild HA/month  Location: crown  Quality: pressure  Severity: severe is 8/10, mild is 2/10  Duration: hours to days  Assoc sx: +nausea, -vomiting, +photophobia, + phonophobia, no vision changes, occasional low pitched ringing, not whooshing sound     Has severe nausea and requires ODT.        Abortives:Excedrin, Tylenol, IBU, Nurtec  Preventatives:Cymbalta, Lyrica, metoprolol, Nurtec, propranolol      Tremor  Bilat hand tremor resolved on BB.   R>L, worse with activity, stress and fatigue. Slight vertical head tremor. Onset 3 years ago.      Paresthesia  Symptoms controlled on Lyrica and Duloxetine. Compliant with meds no side effects.  Bilat UE and LE w numbness, tingling, pins and needles. Intermittent. Feels jittery.  Onset 2021.   Progress Notes by Kem Scruggs MD (2022 13:15)      Conclusion: This study showed neurophysiologic evidence of a  left superficial peroneal sensory neuropathy at the ankle.    White Matter disease  Eating more protein and less processed foods.  Still with trouble focusing and making decisions. Symptoms are stable. High stress. Getting .  Slurred speech is better. Dysphagia is improved.  Still with bladder incontinence and constipation.  Numbness is controlled with meds. Can tell if she misses a dose.  It is more in hands than feet. Hands feel tight. No weakness.   Walking is about the same. Has had 3 falls in last 6 months w knee injury. Several near falls.  Can fall up the stairs. Has to stop and focus when going down the steps. Feels depth perception is off.  MRI Brain With & Without Contrast (02/05/2022 10:42)  LP 2/14/22 CSF 0 OCB. Protein-nml  Labs: musk, mog, nmo-neg.  MRI Brain With & Without Contrast (02/23/2023 11:19)-no new or enlarging lesions. Stable    High Risk medication use/ CSC  Taking Lyrica for paresthesia    HTN  Has had flushing on lisinopril and HCTZ. Taking propranolol. Not checking bp. Feels better on amlodipine.    Daytime Somnolence/ML  Previously had sleep study. Using her cpap. Started back on it this week.     PMH: htn  FH:  SH:   Subjective      Past Medical History:   Past Medical History:   Diagnosis Date   • Headache, tension-type    • HTN (hypertension) 04/11/2019   • Hypertrophy of fat pad 04/11/2019   • Idiopathic peripheral neuropathy 6/2/2023   • Intermittent tremor 07/13/2020   • Migraine without aura and without status migrainosus, not intractable 04/15/2022   • Numbness and tingling of both lower extremities 02/21/2022   • ML (obstructive sleep apnea) 5/11/2023       Past Surgical History:   Past Surgical History:   Procedure Laterality Date   • BREAST AUGMENTATION     • EXPLORATORY LAPAROTOMY         Family History:   Family History   Problem Relation Age of Onset   • Liver cancer Mother    • Heart disease Father    • Skin cancer Father    • No Known Problems Sister     • No Known Problems Brother    • Breast cancer Maternal Grandmother    • Colon cancer Maternal Grandmother    • Leukemia Maternal Grandmother    • No Known Problems Maternal Grandfather    • Dementia Paternal Grandmother    • No Known Problems Paternal Grandfather    • No Known Problems Daughter    • Hashimoto's thyroiditis Daughter        Social History:   Social History     Socioeconomic History   • Marital status:    Tobacco Use   • Smoking status: Never   • Smokeless tobacco: Never   Substance and Sexual Activity   • Alcohol use: Yes     Alcohol/week: 2.0 standard drinks     Types: 2 Glasses of wine per week     Comment: per year   • Drug use: Never   • Sexual activity: Yes     Partners: Male     Birth control/protection: Implant, None     Comment: Nexplanon       Medications:     Current Outpatient Medications:   •  amLODIPine (NORVASC) 5 MG tablet, Take 1 tablet by mouth Daily., Disp: 30 tablet, Rfl: 11  •  DULoxetine (CYMBALTA) 60 MG capsule, 2 (Two) Times a Day. Taking BID, Disp: , Rfl:   •  Etonogestrel (NEXPLANON) 68 MG implant subdermal implant, 1 each by Other route., Disp: , Rfl:   •  pregabalin (Lyrica) 75 MG capsule, Take 1 capsule by mouth 2 (Two) Times a Day., Disp: 180 capsule, Rfl: 1  •  Progesterone (PROMETRIUM) 200 MG capsule, , Disp: , Rfl:   •  propranolol (INDERAL) 40 MG tablet, Take 1 tablet by mouth 2 (Two) Times a Day., Disp: , Rfl:     Allergies:   Allergies   Allergen Reactions   • Clindamycin Itching, Rash and Unknown (See Comments)   • Latex Itching     sensitivity       PHQ-9 Total Score:     STEADI Fall Risk Assessment has not been completed.    Objective     Physical Exam:   Physical Exam  Eyes:      Pupils: Pupils are equal, round, and reactive to light.   Neurological:      Mental Status: She is oriented to person, place, and time.      Coordination: Finger-Nose-Finger Test, Heel to Shin Test and Romberg Test normal.      Gait: Gait is intact.      Deep Tendon Reflexes:       Reflex Scores:       Tricep reflexes are 2+ on the right side and 2+ on the left side.       Bicep reflexes are 2+ on the right side and 2+ on the left side.       Brachioradialis reflexes are 2+ on the right side and 2+ on the left side.       Patellar reflexes are 2+ on the right side and 2+ on the left side.       Achilles reflexes are 2+ on the right side and 2+ on the left side.  Psychiatric:         Speech: Speech normal.         Neurologic Exam     Mental Status   Oriented to person, place, and time.   Follows 3 step commands.   Attention: normal. Concentration: normal.   Speech: speech is normal   Level of consciousness: alert  Knowledge: consistent with education.   Normal comprehension.     Cranial Nerves     CN III, IV, VI   Pupils are equal, round, and reactive to light.  Right pupil: Accommodation: intact.   Left pupil: Accommodation: intact.   CN III: no CN III palsy  CN VI: no CN VI palsy  Nystagmus: none   Diplopia: none  Upgaze: normal  Downgaze: normal  Conjugate gaze: present    CN VII   Facial expression full, symmetric.     CN VIII   Hearing: intact    CN XII   CN XII normal.     Motor Exam   Muscle bulk: normal  Overall muscle tone: normal    Strength   Right biceps: 5/5  Left biceps: 5/5  Right triceps: 5/5  Left triceps: 5/5  Right interossei: 5/5  Left interossei: 5/5  Right quadriceps: 5/5  Left quadriceps: 5/5  Right anterior tibial: 5/5  Left anterior tibial: 5/5  Right posterior tibial: 5/5  Left posterior tibial: 5/5    Sensory Exam   Light touch normal.     Gait, Coordination, and Reflexes     Gait  Gait: normal    Coordination   Romberg: negative  Finger to nose coordination: normal  Heel to shin coordination: normal    Tremor   Resting tremor: absent  Action tremor: absent    Reflexes   Right brachioradialis: 2+  Left brachioradialis: 2+  Right biceps: 2+  Left biceps: 2+  Right triceps: 2+  Left triceps: 2+  Right patellar: 2+  Left patellar: 2+  Right achilles: 2+  Left  "achilles: 2+  Right : 2+  Left : 2+       Vital Signs:   Vitals:    06/13/23 1258   BP: 128/76   Pulse: 58   SpO2: 99%   Weight: 75 kg (165 lb 6.4 oz)   Height: 167.6 cm (65.98\")     Body mass index is 26.71 kg/m².     Results:   Imaging:   MRI Brain With & Without Contrast    Result Date: 2/24/2023  Impression: Stable contrast-enhanced MRI of the brain demonstrating supratentorial subcortical and juxtacortical white matter lesions consistent with sequela of demyelinating process. There is no evidence of interval new or acute demyelinating lesions. Electronically Signed: Efraín Ruby  2/24/2023 5:33 AM EST  Workstation ID: [id number]    Home Sleep Study    Result Date: 5/30/2023         1.  Moderate obstructive sleep apnea.      2.  Snoring.     RECOMMENDATIONS: 1.  Available data is suggestive of moderate obstructive sleep apnea. Recommend auto CPAP trial 4 to 20 cm of water pressure and follow up with CPAP download to make sure we are not missing any other pathology.  2.  Discuss good sleep hygiene habits, including but not limited to fixed wake up and sleep time, avoid alcohol 4 hours before sleep and not driving while drowsy. Maintain ideal body weight.      I have conducted an epoch by epoch review of the raw data and agree with the interpreta  Pj Mcallister MD, [facility] and Sleep [facility]           Assessment / Plan      Assessment/Plan:   Diagnoses and all orders for this visit:    1. Paresthesia (Primary)  Comments:  Worsening UE sx. Taking Lyrica and cymbalta.  Orders:  -     EMG & Nerve Conduction Test; Future  -     Ambulatory Referral to Physical Therapy Evaluate and treat    2. Weakness of both hands  -     EMG & Nerve Conduction Test; Future  -     Ambulatory Referral to Physical Therapy Evaluate and treat    3. Neck pain  -     EMG & Nerve Conduction Test; Future  -     Ambulatory Referral to Physical Therapy Evaluate and treat    4. Migraine without aura and without " status migrainosus, not intractable  Comments:  Cont propranolol, amlodipine    5. Numbness and tingling of both lower extremities  Comments:  Stable on lyrica and duloxetine.    6. Tremor  Comments:  Controlled on propranolol           Patient Education:    As a part of this patient's therapy a controlled substance was prescribed. Instructed on the safe and proper use of this medication along with potential risks. Controlled substance contract signed and scanned. Bernard will be reviewed and UDS obtained as indicated.    Reviewed medications, potential side effects and signs and symptoms to report. Discussed risk versus benefits of treatment plan with patient and/or family-including medications, labs and radiology that may be ordered. Addressed questions and concerns during visit. Patient and/or family verbalized understanding and agree with plan. Instructed to call the office with any questions and report to ER with any life-threatening symptoms.     Follow Up:   Return in about 4 months (around 10/13/2023) for Recheck.    During this visit the following were done:  Labs Reviewed [x]    Labs Ordered [x]    Radiology Reports Reviewed [x]    Radiology Ordered []    PCP Records Reviewed []    Referring Provider Records Reviewed []    ER Records Reviewed []    Hospital Records Reviewed []    History Obtained From Family []    Radiology Images Reviewed []    Other Reviewed [x]    Records Requested []      Titi Méndez, DNP, APRN

## 2023-06-13 NOTE — LETTER
2023     Dayanna Arango MD  [address]  [place] [address]    Patient: Obdulia Ramesh   YOB: 1975   Date of Visit: 2023       Dear Dr. Nba MD:    Thank you for referring Obdulia Ramesh to me for evaluation. Below are the relevant portions of my assessment and plan of care.    If you have questions, please do not hesitate to call me. I look forward to following Obdulia along with you.         Sincerely,        Titi Méndez DNP, APRRENAE        CC: No Recipients    Titi Méndez DNP, VINI  [phone]  Signed     Neuro Office Visit      Encounter Date: 2023   Patient Name: Obdulia Ramesh  : 1975   MRN: [id number]   PCP: Dr Arango  Chief Complaint:    Chief Complaint   Patient presents with   • White abnormality on MRI of brain       History of Present Illness: Obdulia Ramesh is a 47 y.o. female who is here today in Neurology for  migraine, tremor, paresthesia, white matter disease, daytime somnolence    Last visit 2023 w me-MRI brain, samples of qulipta and ubrelvy, cont propranolol and amlodipine, refer to sleep med.      MRI Brain With & Without Contrast (2023 11:19)-no new or enlarging lesions. Stable    Neck pain w paresthesia  Has increased pain in neck that is worse. Treats with  mg bid with good results but never relieved. Has some tightness in arms. Numbness and tingling in bilat fingerips. All fingers involved. She is dropping items. Feels arms are worse then legs.      Migraine  Migraines are controlled.  Taking propranolol 40 bid.  Stopped the Nurtec due to $75 copay.     Onset years ago  Freq: 7 severe daily mild HA/month  Location: crown  Quality: pressure  Severity: severe is 8/10, mild is 2/10  Duration: hours to days  Assoc sx: +nausea, -vomiting, +photophobia, + phonophobia, no vision changes, occasional low pitched ringing, not whooshing sound     Has severe nausea and requires ODT.         Abortives:Excedrin, Tylenol, IBU, Nurtec  Preventatives:Cymbalta, Lyrica, metoprolol, Nurtec, propranolol      Tremor  Bilat hand tremor resolved on BB.   R>L, worse with activity, stress and fatigue. Slight vertical head tremor. Onset 3 years ago.      Paresthesia  Symptoms controlled on Lyrica and Duloxetine. Compliant with meds no side effects.  Bilat UE and LE w numbness, tingling, pins and needles. Intermittent. Feels jittery.  Onset March 2021.   Progress Notes by Kem Scruggs MD (02/21/2022 13:15)      Conclusion: This study showed neurophysiologic evidence of a left superficial peroneal sensory neuropathy at the ankle.    White Matter disease  Eating more protein and less processed foods.  Still with trouble focusing and making decisions. Symptoms are stable. High stress. Getting .  Slurred speech is better. Dysphagia is improved.  Still with bladder incontinence and constipation.  Numbness is controlled with meds. Can tell if she misses a dose.  It is more in hands than feet. Hands feel tight. No weakness.   Walking is about the same. Has had 3 falls in last 6 months w knee injury. Several near falls.  Can fall up the stairs. Has to stop and focus when going down the steps. Feels depth perception is off.  MRI Brain With & Without Contrast (02/05/2022 10:42)  LP 2/14/22 CSF 0 OCB. Protein-nml  Labs: musk, mog, nmo-neg.  MRI Brain With & Without Contrast (02/23/2023 11:19)-no new or enlarging lesions. Stable    High Risk medication use/ CSC  Taking Lyrica for paresthesia    HTN  Has had flushing on lisinopril and HCTZ. Taking propranolol. Not checking bp. Feels better on amlodipine.    Daytime Somnolence/ML  Previously had sleep study. Using her cpap. Started back on it this week.     PMH: htn  FH:  SH:   Subjective      Past Medical History:   Past Medical History:   Diagnosis Date   • Headache, tension-type    • HTN (hypertension) 04/11/2019   • Hypertrophy of fat pad 04/11/2019   •  Idiopathic peripheral neuropathy 6/2/2023   • Intermittent tremor 07/13/2020   • Migraine without aura and without status migrainosus, not intractable 04/15/2022   • Numbness and tingling of both lower extremities 02/21/2022   • ML (obstructive sleep apnea) 5/11/2023       Past Surgical History:   Past Surgical History:   Procedure Laterality Date   • BREAST AUGMENTATION     • EXPLORATORY LAPAROTOMY         Family History:   Family History   Problem Relation Age of Onset   • Liver cancer Mother    • Heart disease Father    • Skin cancer Father    • No Known Problems Sister    • No Known Problems Brother    • Breast cancer Maternal Grandmother    • Colon cancer Maternal Grandmother    • Leukemia Maternal Grandmother    • No Known Problems Maternal Grandfather    • Dementia Paternal Grandmother    • No Known Problems Paternal Grandfather    • No Known Problems Daughter    • Hashimoto's thyroiditis Daughter        Social History:   Social History     Socioeconomic History   • Marital status:    Tobacco Use   • Smoking status: Never   • Smokeless tobacco: Never   Substance and Sexual Activity   • Alcohol use: Yes     Alcohol/week: 2.0 standard drinks     Types: 2 Glasses of wine per week     Comment: per year   • Drug use: Never   • Sexual activity: Yes     Partners: Male     Birth control/protection: Implant, None     Comment: Nexplanon       Medications:     Current Outpatient Medications:   •  amLODIPine (NORVASC) 5 MG tablet, Take 1 tablet by mouth Daily., Disp: 30 tablet, Rfl: 11  •  DULoxetine (CYMBALTA) 60 MG capsule, 2 (Two) Times a Day. Taking BID, Disp: , Rfl:   •  Etonogestrel (NEXPLANON) 68 MG implant subdermal implant, 1 each by Other route., Disp: , Rfl:   •  pregabalin (Lyrica) 75 MG capsule, Take 1 capsule by mouth 2 (Two) Times a Day., Disp: 180 capsule, Rfl: 1  •  Progesterone (PROMETRIUM) 200 MG capsule, , Disp: , Rfl:   •  propranolol (INDERAL) 40 MG tablet, Take 1 tablet by mouth 2 (Two)  Times a Day., Disp: , Rfl:     Allergies:   Allergies   Allergen Reactions   • Clindamycin Itching, Rash and Unknown (See Comments)   • Latex Itching     sensitivity       PHQ-9 Total Score:     ROSIBEL Fall Risk Assessment has not been completed.    Objective     Physical Exam:   Physical Exam  Eyes:      Pupils: Pupils are equal, round, and reactive to light.   Neurological:      Mental Status: She is oriented to person, place, and time.      Coordination: Finger-Nose-Finger Test, Heel to Shin Test and Romberg Test normal.      Gait: Gait is intact.      Deep Tendon Reflexes:      Reflex Scores:       Tricep reflexes are 2+ on the right side and 2+ on the left side.       Bicep reflexes are 2+ on the right side and 2+ on the left side.       Brachioradialis reflexes are 2+ on the right side and 2+ on the left side.       Patellar reflexes are 2+ on the right side and 2+ on the left side.       Achilles reflexes are 2+ on the right side and 2+ on the left side.  Psychiatric:         Speech: Speech normal.         Neurologic Exam     Mental Status   Oriented to person, place, and time.   Follows 3 step commands.   Attention: normal. Concentration: normal.   Speech: speech is normal   Level of consciousness: alert  Knowledge: consistent with education.   Normal comprehension.     Cranial Nerves     CN III, IV, VI   Pupils are equal, round, and reactive to light.  Right pupil: Accommodation: intact.   Left pupil: Accommodation: intact.   CN III: no CN III palsy  CN VI: no CN VI palsy  Nystagmus: none   Diplopia: none  Upgaze: normal  Downgaze: normal  Conjugate gaze: present    CN VII   Facial expression full, symmetric.     CN VIII   Hearing: intact    CN XII   CN XII normal.     Motor Exam   Muscle bulk: normal  Overall muscle tone: normal    Strength   Right biceps: 5/5  Left biceps: 5/5  Right triceps: 5/5  Left triceps: 5/5  Right interossei: 5/5  Left interossei: 5/5  Right quadriceps: 5/5  Left quadriceps:  "5/5  Right anterior tibial: 5/5  Left anterior tibial: 5/5  Right posterior tibial: 5/5  Left posterior tibial: 5/5    Sensory Exam   Light touch normal.     Gait, Coordination, and Reflexes     Gait  Gait: normal    Coordination   Romberg: negative  Finger to nose coordination: normal  Heel to shin coordination: normal    Tremor   Resting tremor: absent  Action tremor: absent    Reflexes   Right brachioradialis: 2+  Left brachioradialis: 2+  Right biceps: 2+  Left biceps: 2+  Right triceps: 2+  Left triceps: 2+  Right patellar: 2+  Left patellar: 2+  Right achilles: 2+  Left achilles: 2+  Right : 2+  Left : 2+       Vital Signs:   Vitals:    06/13/23 1258   BP: 128/76   Pulse: 58   SpO2: 99%   Weight: 75 kg (165 lb 6.4 oz)   Height: 167.6 cm (65.98\")     Body mass index is 26.71 kg/m².     Results:   Imaging:   MRI Brain With & Without Contrast    Result Date: 2/24/2023  Impression: Stable contrast-enhanced MRI of the brain demonstrating supratentorial subcortical and juxtacortical white matter lesions consistent with sequela of demyelinating process. There is no evidence of interval new or acute demyelinating lesions. Electronically Signed: Efraín Ruby  2/24/2023 5:33 AM EST  Workstation ID: [id number]    Home Sleep Study    Result Date: 5/30/2023         1.  Moderate obstructive sleep apnea.      2.  Snoring.     RECOMMENDATIONS: 1.  Available data is suggestive of moderate obstructive sleep apnea. Recommend auto CPAP trial 4 to 20 cm of water pressure and follow up with CPAP download to make sure we are not missing any other pathology.  2.  Discuss good sleep hygiene habits, including but not limited to fixed wake up and sleep time, avoid alcohol 4 hours before sleep and not driving while drowsy. Maintain ideal body weight.      I have conducted an epoch by epoch review of the raw data and agree with the interpreta  Pj Mcallister MD, [facility] Pul[facility] Critical care and Sleep medicine           Assessment " / Plan      Assessment/Plan:   Diagnoses and all orders for this visit:    1. Paresthesia (Primary)  Comments:  Worsening UE sx. Taking Lyrica and cymbalta.  Orders:  -     EMG & Nerve Conduction Test; Future  -     Ambulatory Referral to Physical Therapy Evaluate and treat    2. Weakness of both hands  -     EMG & Nerve Conduction Test; Future  -     Ambulatory Referral to Physical Therapy Evaluate and treat    3. Neck pain  -     EMG & Nerve Conduction Test; Future  -     Ambulatory Referral to Physical Therapy Evaluate and treat    4. Migraine without aura and without status migrainosus, not intractable  Comments:  Cont propranolol, amlodipine    5. Numbness and tingling of both lower extremities  Comments:  Stable on lyrica and duloxetine.    6. Tremor  Comments:  Controlled on propranolol           Patient Education:    As a part of this patient's therapy a controlled substance was prescribed. Instructed on the safe and proper use of this medication along with potential risks. Controlled substance contract signed and scanned. Bernard will be reviewed and UDS obtained as indicated.    Reviewed medications, potential side effects and signs and symptoms to report. Discussed risk versus benefits of treatment plan with patient and/or family-including medications, labs and radiology that may be ordered. Addressed questions and concerns during visit. Patient and/or family verbalized understanding and agree with plan. Instructed to call the office with any questions and report to ER with any life-threatening symptoms.     Follow Up:   Return in about 4 months (around 10/13/2023) for Recheck.    During this visit the following were done:  Labs Reviewed [x]    Labs Ordered [x]    Radiology Reports Reviewed [x]    Radiology Ordered []    PCP Records Reviewed []    Referring Provider Records Reviewed []    ER Records Reviewed []    Hospital Records Reviewed []    History Obtained From Family []    Radiology Images Reviewed  []    Other Reviewed [x]    Records Requested []      Titi Méndez, DNP, APRN